# Patient Record
Sex: FEMALE | Race: WHITE | NOT HISPANIC OR LATINO | Employment: OTHER | URBAN - METROPOLITAN AREA
[De-identification: names, ages, dates, MRNs, and addresses within clinical notes are randomized per-mention and may not be internally consistent; named-entity substitution may affect disease eponyms.]

---

## 2023-02-23 LAB
ALANINE AMINOTRANSFERASE (SGPT) (U/L) IN SER/PLAS: 15 U/L (ref 7–45)
ALBUMIN (G/DL) IN SER/PLAS: 4.3 G/DL (ref 3.4–5)
ALKALINE PHOSPHATASE (U/L) IN SER/PLAS: 72 U/L (ref 33–136)
ANION GAP IN SER/PLAS: 14 MMOL/L (ref 10–20)
ASPARTATE AMINOTRANSFERASE (SGOT) (U/L) IN SER/PLAS: 18 U/L (ref 9–39)
BASOPHILS (10*3/UL) IN BLOOD BY AUTOMATED COUNT: 0.04 X10E9/L (ref 0–0.1)
BASOPHILS/100 LEUKOCYTES IN BLOOD BY AUTOMATED COUNT: 0.7 % (ref 0–2)
BILIRUBIN TOTAL (MG/DL) IN SER/PLAS: 0.3 MG/DL (ref 0–1.2)
CALCIDIOL (25 OH VITAMIN D3) (NG/ML) IN SER/PLAS: 79 NG/ML
CALCIUM (MG/DL) IN SER/PLAS: 9.9 MG/DL (ref 8.6–10.6)
CARBON DIOXIDE, TOTAL (MMOL/L) IN SER/PLAS: 24 MMOL/L (ref 21–32)
CHLORIDE (MMOL/L) IN SER/PLAS: 106 MMOL/L (ref 98–107)
CREATININE (MG/DL) IN SER/PLAS: 0.76 MG/DL (ref 0.5–1.05)
EOSINOPHILS (10*3/UL) IN BLOOD BY AUTOMATED COUNT: 0.04 X10E9/L (ref 0–0.4)
EOSINOPHILS/100 LEUKOCYTES IN BLOOD BY AUTOMATED COUNT: 0.7 % (ref 0–6)
ERYTHROCYTE DISTRIBUTION WIDTH (RATIO) BY AUTOMATED COUNT: 12.6 % (ref 11.5–14.5)
ERYTHROCYTE MEAN CORPUSCULAR HEMOGLOBIN CONCENTRATION (G/DL) BY AUTOMATED: 31.3 G/DL (ref 32–36)
ERYTHROCYTE MEAN CORPUSCULAR VOLUME (FL) BY AUTOMATED COUNT: 94 FL (ref 80–100)
ERYTHROCYTES (10*6/UL) IN BLOOD BY AUTOMATED COUNT: 4.24 X10E12/L (ref 4–5.2)
FERRITIN (UG/LL) IN SER/PLAS: 43 UG/L (ref 8–150)
GFR FEMALE: 81 ML/MIN/1.73M2
GLUCOSE (MG/DL) IN SER/PLAS: 90 MG/DL (ref 74–99)
HEMATOCRIT (%) IN BLOOD BY AUTOMATED COUNT: 39.9 % (ref 36–46)
HEMOGLOBIN (G/DL) IN BLOOD: 12.5 G/DL (ref 12–16)
IMMATURE GRANULOCYTES/100 LEUKOCYTES IN BLOOD BY AUTOMATED COUNT: 0.2 % (ref 0–0.9)
IRON (UG/DL) IN SER/PLAS: 41 UG/DL (ref 35–150)
IRON BINDING CAPACITY (UG/DL) IN SER/PLAS: 325 UG/DL (ref 240–445)
IRON SATURATION (%) IN SER/PLAS: 13 % (ref 25–45)
LEUKOCYTES (10*3/UL) IN BLOOD BY AUTOMATED COUNT: 5.7 X10E9/L (ref 4.4–11.3)
LYMPHOCYTES (10*3/UL) IN BLOOD BY AUTOMATED COUNT: 2.21 X10E9/L (ref 0.8–3)
LYMPHOCYTES/100 LEUKOCYTES IN BLOOD BY AUTOMATED COUNT: 38.6 % (ref 13–44)
MAGNESIUM (MG/DL) IN SER/PLAS: 2.08 MG/DL (ref 1.6–2.4)
MONOCYTES (10*3/UL) IN BLOOD BY AUTOMATED COUNT: 0.85 X10E9/L (ref 0.05–0.8)
MONOCYTES/100 LEUKOCYTES IN BLOOD BY AUTOMATED COUNT: 14.8 % (ref 2–10)
NEUTROPHILS (10*3/UL) IN BLOOD BY AUTOMATED COUNT: 2.58 X10E9/L (ref 1.6–5.5)
NEUTROPHILS/100 LEUKOCYTES IN BLOOD BY AUTOMATED COUNT: 45 % (ref 40–80)
NRBC (PER 100 WBCS) BY AUTOMATED COUNT: 0 /100 WBC (ref 0–0)
PLATELETS (10*3/UL) IN BLOOD AUTOMATED COUNT: 281 X10E9/L (ref 150–450)
POTASSIUM (MMOL/L) IN SER/PLAS: 4.2 MMOL/L (ref 3.5–5.3)
PROTEIN TOTAL: 6.8 G/DL (ref 6.4–8.2)
SODIUM (MMOL/L) IN SER/PLAS: 140 MMOL/L (ref 136–145)
THYROTROPIN (MIU/L) IN SER/PLAS BY DETECTION LIMIT <= 0.05 MIU/L: 0.99 MIU/L (ref 0.44–3.98)
THYROXINE (T4) FREE (NG/DL) IN SER/PLAS: 1.41 NG/DL (ref 0.78–1.48)
UREA NITROGEN (MG/DL) IN SER/PLAS: 15 MG/DL (ref 6–23)

## 2023-04-29 LAB
ERYTHROCYTE DISTRIBUTION WIDTH (RATIO) BY AUTOMATED COUNT: 16.4 % (ref 11.5–14.5)
ERYTHROCYTE MEAN CORPUSCULAR HEMOGLOBIN CONCENTRATION (G/DL) BY AUTOMATED: 32.7 G/DL (ref 32–36)
ERYTHROCYTE MEAN CORPUSCULAR VOLUME (FL) BY AUTOMATED COUNT: 87 FL (ref 80–100)
ERYTHROCYTES (10*6/UL) IN BLOOD BY AUTOMATED COUNT: 4.63 X10E12/L (ref 4–5.2)
FERRITIN (UG/LL) IN SER/PLAS: 45 UG/L (ref 8–150)
HEMATOCRIT (%) IN BLOOD BY AUTOMATED COUNT: 40.4 % (ref 36–46)
HEMOGLOBIN (G/DL) IN BLOOD: 13.2 G/DL (ref 12–16)
IRON (UG/DL) IN SER/PLAS: 136 UG/DL (ref 35–150)
IRON BINDING CAPACITY (UG/DL) IN SER/PLAS: 309 UG/DL (ref 240–445)
IRON SATURATION (%) IN SER/PLAS: 44 % (ref 25–45)
LEUKOCYTES (10*3/UL) IN BLOOD BY AUTOMATED COUNT: 8.3 X10E9/L (ref 4.4–11.3)
NRBC (PER 100 WBCS) BY AUTOMATED COUNT: 0 /100 WBC (ref 0–0)
PLATELETS (10*3/UL) IN BLOOD AUTOMATED COUNT: 272 X10E9/L (ref 150–450)

## 2023-05-04 DIAGNOSIS — I10 ESSENTIAL (PRIMARY) HYPERTENSION: ICD-10-CM

## 2023-05-04 PROBLEM — R25.2 LEG CRAMPS: Status: ACTIVE | Noted: 2023-05-04

## 2023-05-04 PROBLEM — E83.119 HEMOCHROMATOSIS: Status: ACTIVE | Noted: 2023-05-04

## 2023-05-04 PROBLEM — M25.561 KNEE PAIN, RIGHT: Status: ACTIVE | Noted: 2023-05-04

## 2023-05-04 PROBLEM — E04.2 NONTOXIC MULTINODULAR GOITER: Status: ACTIVE | Noted: 2023-05-04

## 2023-05-04 PROBLEM — G57.12 MERALGIA PARESTHETICA OF LEFT SIDE: Status: ACTIVE | Noted: 2023-05-04

## 2023-05-04 PROBLEM — R05.9 COUGH: Status: ACTIVE | Noted: 2023-05-04

## 2023-05-04 PROBLEM — E55.9 VITAMIN D DEFICIENCY: Status: ACTIVE | Noted: 2023-05-04

## 2023-05-04 PROBLEM — J31.0 CHRONIC RHINITIS: Status: ACTIVE | Noted: 2023-05-04

## 2023-05-04 PROBLEM — M17.11 OSTEOARTHRITIS OF RIGHT KNEE: Status: ACTIVE | Noted: 2023-05-04

## 2023-05-04 PROBLEM — R82.90 ABNORMAL URINE FINDINGS: Status: ACTIVE | Noted: 2023-05-04

## 2023-05-04 PROBLEM — L30.9 DERMATITIS: Status: ACTIVE | Noted: 2023-05-04

## 2023-05-04 PROBLEM — G45.4 TGA (TRANSIENT GLOBAL AMNESIA): Status: ACTIVE | Noted: 2023-05-04

## 2023-05-04 PROBLEM — R91.1 PULMONARY NODULE: Status: ACTIVE | Noted: 2023-05-04

## 2023-05-04 PROBLEM — E78.00 HYPERCHOLESTEROLEMIA: Status: ACTIVE | Noted: 2023-05-04

## 2023-05-04 PROBLEM — G47.33 OSA (OBSTRUCTIVE SLEEP APNEA): Status: ACTIVE | Noted: 2023-05-04

## 2023-05-04 PROBLEM — M85.80 OSTEOPENIA: Status: ACTIVE | Noted: 2023-05-04

## 2023-05-04 PROBLEM — R06.83 SNORING: Status: ACTIVE | Noted: 2023-05-04

## 2023-05-04 PROBLEM — M17.0 PRIMARY OSTEOARTHRITIS OF BOTH KNEES: Status: ACTIVE | Noted: 2023-05-04

## 2023-05-04 PROBLEM — E03.9 HYPOTHYROIDISM: Status: ACTIVE | Noted: 2023-05-04

## 2023-05-04 PROBLEM — R20.2 PARESTHESIA OF FOOT: Status: ACTIVE | Noted: 2023-05-04

## 2023-05-04 RX ORDER — LOSARTAN POTASSIUM 25 MG/1
TABLET ORAL
Qty: 90 TABLET | Refills: 1 | Status: SHIPPED | OUTPATIENT
Start: 2023-05-04 | End: 2023-08-31 | Stop reason: SDUPTHER

## 2023-06-18 DIAGNOSIS — E03.9 HYPOTHYROIDISM, UNSPECIFIED TYPE: Primary | ICD-10-CM

## 2023-06-19 RX ORDER — LEVOTHYROXINE SODIUM 100 UG/1
TABLET ORAL
Qty: 90 TABLET | Refills: 1 | Status: SHIPPED | OUTPATIENT
Start: 2023-06-19 | End: 2023-08-31 | Stop reason: SDUPTHER

## 2023-08-03 RX ORDER — ACETAMINOPHEN 500 MG
TABLET ORAL
COMMUNITY
End: 2023-10-24

## 2023-08-03 RX ORDER — HYLAN G-F 20 16MG/2ML
SYRINGE (ML) INTRAARTICULAR
COMMUNITY
Start: 2022-08-04 | End: 2023-08-31 | Stop reason: WASHOUT

## 2023-08-03 RX ORDER — PNV NO.95/FERROUS FUM/FOLIC AC 28MG-0.8MG
100 TABLET ORAL DAILY
COMMUNITY
End: 2023-11-03 | Stop reason: WASHOUT

## 2023-08-03 RX ORDER — MULTIVITAMIN
1 TABLET ORAL DAILY
COMMUNITY
End: 2023-11-03 | Stop reason: WASHOUT

## 2023-08-03 RX ORDER — ASCORBIC ACID 250 MG
1 TABLET ORAL DAILY
COMMUNITY
End: 2023-11-03 | Stop reason: SINTOL

## 2023-08-31 ENCOUNTER — LAB (OUTPATIENT)
Dept: LAB | Facility: LAB | Age: 78
End: 2023-08-31
Payer: MEDICARE

## 2023-08-31 ENCOUNTER — OFFICE VISIT (OUTPATIENT)
Dept: PRIMARY CARE | Facility: CLINIC | Age: 78
End: 2023-08-31
Payer: MEDICARE

## 2023-08-31 VITALS
WEIGHT: 143.5 LBS | BODY MASS INDEX: 24.5 KG/M2 | DIASTOLIC BLOOD PRESSURE: 78 MMHG | SYSTOLIC BLOOD PRESSURE: 122 MMHG | HEIGHT: 64 IN | RESPIRATION RATE: 16 BRPM | HEART RATE: 72 BPM

## 2023-08-31 DIAGNOSIS — E03.9 ACQUIRED HYPOTHYROIDISM: ICD-10-CM

## 2023-08-31 DIAGNOSIS — Z00.00 ROUTINE GENERAL MEDICAL EXAMINATION AT HEALTH CARE FACILITY: ICD-10-CM

## 2023-08-31 DIAGNOSIS — E55.9 VITAMIN D DEFICIENCY: ICD-10-CM

## 2023-08-31 DIAGNOSIS — E83.119 HEMOCHROMATOSIS, UNSPECIFIED HEMOCHROMATOSIS TYPE: ICD-10-CM

## 2023-08-31 DIAGNOSIS — Z00.00 MEDICARE ANNUAL WELLNESS VISIT, SUBSEQUENT: ICD-10-CM

## 2023-08-31 DIAGNOSIS — M85.80 OSTEOPENIA, UNSPECIFIED LOCATION: ICD-10-CM

## 2023-08-31 DIAGNOSIS — I10 BENIGN ESSENTIAL HYPERTENSION: ICD-10-CM

## 2023-08-31 DIAGNOSIS — E03.9 HYPOTHYROIDISM, UNSPECIFIED TYPE: ICD-10-CM

## 2023-08-31 DIAGNOSIS — I10 ESSENTIAL (PRIMARY) HYPERTENSION: ICD-10-CM

## 2023-08-31 DIAGNOSIS — Z00.00 MEDICARE ANNUAL WELLNESS VISIT, SUBSEQUENT: Primary | ICD-10-CM

## 2023-08-31 DIAGNOSIS — E78.00 HYPERCHOLESTEROLEMIA: ICD-10-CM

## 2023-08-31 LAB
ALANINE AMINOTRANSFERASE (SGPT) (U/L) IN SER/PLAS: 12 U/L (ref 7–45)
ALBUMIN (G/DL) IN SER/PLAS: 4.4 G/DL (ref 3.4–5)
ALKALINE PHOSPHATASE (U/L) IN SER/PLAS: 80 U/L (ref 33–136)
ANION GAP IN SER/PLAS: 13 MMOL/L (ref 10–20)
APPEARANCE, URINE: CLEAR
ASPARTATE AMINOTRANSFERASE (SGOT) (U/L) IN SER/PLAS: 14 U/L (ref 9–39)
BASOPHILS (10*3/UL) IN BLOOD BY AUTOMATED COUNT: 0.06 X10E9/L (ref 0–0.1)
BASOPHILS/100 LEUKOCYTES IN BLOOD BY AUTOMATED COUNT: 0.7 % (ref 0–2)
BILIRUBIN TOTAL (MG/DL) IN SER/PLAS: 0.6 MG/DL (ref 0–1.2)
BILIRUBIN, URINE: NEGATIVE
BLOOD, URINE: NEGATIVE
CALCIDIOL (25 OH VITAMIN D3) (NG/ML) IN SER/PLAS: 84 NG/ML
CALCIUM (MG/DL) IN SER/PLAS: 10.8 MG/DL (ref 8.6–10.6)
CARBON DIOXIDE, TOTAL (MMOL/L) IN SER/PLAS: 26 MMOL/L (ref 21–32)
CHLORIDE (MMOL/L) IN SER/PLAS: 106 MMOL/L (ref 98–107)
CHOLESTEROL (MG/DL) IN SER/PLAS: 213 MG/DL (ref 0–199)
CHOLESTEROL IN HDL (MG/DL) IN SER/PLAS: 55.2 MG/DL
CHOLESTEROL/HDL RATIO: 3.9
COLOR, URINE: NORMAL
CREATININE (MG/DL) IN SER/PLAS: 0.76 MG/DL (ref 0.5–1.05)
EOSINOPHILS (10*3/UL) IN BLOOD BY AUTOMATED COUNT: 0.19 X10E9/L (ref 0–0.4)
EOSINOPHILS/100 LEUKOCYTES IN BLOOD BY AUTOMATED COUNT: 2.2 % (ref 0–6)
ERYTHROCYTE DISTRIBUTION WIDTH (RATIO) BY AUTOMATED COUNT: 13.2 % (ref 11.5–14.5)
ERYTHROCYTE MEAN CORPUSCULAR HEMOGLOBIN CONCENTRATION (G/DL) BY AUTOMATED: 33.7 G/DL (ref 32–36)
ERYTHROCYTE MEAN CORPUSCULAR VOLUME (FL) BY AUTOMATED COUNT: 100 FL (ref 80–100)
ERYTHROCYTES (10*6/UL) IN BLOOD BY AUTOMATED COUNT: 4.34 X10E12/L (ref 4–5.2)
GFR FEMALE: 81 ML/MIN/1.73M2
GLUCOSE (MG/DL) IN SER/PLAS: 86 MG/DL (ref 74–99)
GLUCOSE, URINE: NEGATIVE MG/DL
HEMATOCRIT (%) IN BLOOD BY AUTOMATED COUNT: 43.3 % (ref 36–46)
HEMOGLOBIN (G/DL) IN BLOOD: 14.6 G/DL (ref 12–16)
IMMATURE GRANULOCYTES/100 LEUKOCYTES IN BLOOD BY AUTOMATED COUNT: 0.2 % (ref 0–0.9)
KETONES, URINE: NEGATIVE MG/DL
LDL: 123 MG/DL (ref 0–99)
LEUKOCYTE ESTERASE, URINE: NEGATIVE
LEUKOCYTES (10*3/UL) IN BLOOD BY AUTOMATED COUNT: 8.8 X10E9/L (ref 4.4–11.3)
LYMPHOCYTES (10*3/UL) IN BLOOD BY AUTOMATED COUNT: 3.14 X10E9/L (ref 0.8–3)
LYMPHOCYTES/100 LEUKOCYTES IN BLOOD BY AUTOMATED COUNT: 35.6 % (ref 13–44)
MONOCYTES (10*3/UL) IN BLOOD BY AUTOMATED COUNT: 0.88 X10E9/L (ref 0.05–0.8)
MONOCYTES/100 LEUKOCYTES IN BLOOD BY AUTOMATED COUNT: 10 % (ref 2–10)
NEUTROPHILS (10*3/UL) IN BLOOD BY AUTOMATED COUNT: 4.54 X10E9/L (ref 1.6–5.5)
NEUTROPHILS/100 LEUKOCYTES IN BLOOD BY AUTOMATED COUNT: 51.3 % (ref 40–80)
NITRITE, URINE: NEGATIVE
NRBC (PER 100 WBCS) BY AUTOMATED COUNT: 0 /100 WBC (ref 0–0)
PH, URINE: 7 (ref 5–8)
PLATELETS (10*3/UL) IN BLOOD AUTOMATED COUNT: 304 X10E9/L (ref 150–450)
POTASSIUM (MMOL/L) IN SER/PLAS: 4.3 MMOL/L (ref 3.5–5.3)
PROTEIN TOTAL: 7.2 G/DL (ref 6.4–8.2)
PROTEIN, URINE: NEGATIVE MG/DL
SODIUM (MMOL/L) IN SER/PLAS: 141 MMOL/L (ref 136–145)
SPECIFIC GRAVITY, URINE: 1.01 (ref 1–1.03)
THYROTROPIN (MIU/L) IN SER/PLAS BY DETECTION LIMIT <= 0.05 MIU/L: 1.18 MIU/L (ref 0.44–3.98)
TRIGLYCERIDE (MG/DL) IN SER/PLAS: 172 MG/DL (ref 0–149)
UREA NITROGEN (MG/DL) IN SER/PLAS: 22 MG/DL (ref 6–23)
UROBILINOGEN, URINE: <2 MG/DL (ref 0–1.9)
VLDL: 34 MG/DL (ref 0–40)

## 2023-08-31 PROCEDURE — G0439 PPPS, SUBSEQ VISIT: HCPCS | Performed by: INTERNAL MEDICINE

## 2023-08-31 PROCEDURE — 80053 COMPREHEN METABOLIC PANEL: CPT

## 2023-08-31 PROCEDURE — 85025 COMPLETE CBC W/AUTO DIFF WBC: CPT

## 2023-08-31 PROCEDURE — 1170F FXNL STATUS ASSESSED: CPT | Performed by: INTERNAL MEDICINE

## 2023-08-31 PROCEDURE — 3074F SYST BP LT 130 MM HG: CPT | Performed by: INTERNAL MEDICINE

## 2023-08-31 PROCEDURE — 3078F DIAST BP <80 MM HG: CPT | Performed by: INTERNAL MEDICINE

## 2023-08-31 PROCEDURE — 36415 COLL VENOUS BLD VENIPUNCTURE: CPT

## 2023-08-31 PROCEDURE — 1036F TOBACCO NON-USER: CPT | Performed by: INTERNAL MEDICINE

## 2023-08-31 PROCEDURE — 80061 LIPID PANEL: CPT

## 2023-08-31 PROCEDURE — 84443 ASSAY THYROID STIM HORMONE: CPT

## 2023-08-31 PROCEDURE — 1159F MED LIST DOCD IN RCRD: CPT | Performed by: INTERNAL MEDICINE

## 2023-08-31 PROCEDURE — 99397 PER PM REEVAL EST PAT 65+ YR: CPT | Performed by: INTERNAL MEDICINE

## 2023-08-31 PROCEDURE — 1125F AMNT PAIN NOTED PAIN PRSNT: CPT | Performed by: INTERNAL MEDICINE

## 2023-08-31 PROCEDURE — 1160F RVW MEDS BY RX/DR IN RCRD: CPT | Performed by: INTERNAL MEDICINE

## 2023-08-31 PROCEDURE — 81003 URINALYSIS AUTO W/O SCOPE: CPT

## 2023-08-31 PROCEDURE — 82306 VITAMIN D 25 HYDROXY: CPT

## 2023-08-31 RX ORDER — LOSARTAN POTASSIUM 25 MG/1
25 TABLET ORAL DAILY
Qty: 90 TABLET | Refills: 1 | Status: SHIPPED | OUTPATIENT
Start: 2023-08-31 | End: 2024-04-15 | Stop reason: SDUPTHER

## 2023-08-31 RX ORDER — LEVOTHYROXINE SODIUM 100 UG/1
100 TABLET ORAL DAILY
Qty: 90 TABLET | Refills: 1 | Status: SHIPPED | OUTPATIENT
Start: 2023-08-31 | End: 2024-04-15 | Stop reason: SDUPTHER

## 2023-08-31 ASSESSMENT — ENCOUNTER SYMPTOMS
WEAKNESS: 0
BACK PAIN: 0
FATIGUE: 0
EYE PAIN: 0
FREQUENCY: 0
JOINT SWELLING: 0
MYALGIAS: 0
DYSPHORIC MOOD: 0
HEMATURIA: 0
NECK PAIN: 0
DIZZINESS: 0
ARTHRALGIAS: 1
WHEEZING: 0
APPETITE CHANGE: 0
DIAPHORESIS: 0
BLOOD IN STOOL: 0
ABDOMINAL PAIN: 0
FEVER: 0
DIFFICULTY URINATING: 0
CHILLS: 0
ADENOPATHY: 0
DYSURIA: 0
SINUS PRESSURE: 0
CONFUSION: 0
LIGHT-HEADEDNESS: 0
SLEEP DISTURBANCE: 0
EYE DISCHARGE: 0
NERVOUS/ANXIOUS: 0
CONSTIPATION: 0
ACTIVITY CHANGE: 0
DIARRHEA: 0
SORE THROAT: 0
SHORTNESS OF BREATH: 0
FLANK PAIN: 0
CHEST TIGHTNESS: 0
EYE ITCHING: 0
HEADACHES: 0
COUGH: 0
PALPITATIONS: 0
BRUISES/BLEEDS EASILY: 0
VOMITING: 0
RHINORRHEA: 0
NAUSEA: 0
POLYDIPSIA: 0
FACIAL SWELLING: 0
NUMBNESS: 0
POLYPHAGIA: 0

## 2023-08-31 ASSESSMENT — ACTIVITIES OF DAILY LIVING (ADL)
DOING_HOUSEWORK: INDEPENDENT
DRESSING: INDEPENDENT
GROCERY_SHOPPING: INDEPENDENT
TAKING_MEDICATION: INDEPENDENT
MANAGING_FINANCES: INDEPENDENT
BATHING: INDEPENDENT

## 2023-08-31 ASSESSMENT — PATIENT HEALTH QUESTIONNAIRE - PHQ9
SUM OF ALL RESPONSES TO PHQ9 QUESTIONS 1 AND 2: 0
2. FEELING DOWN, DEPRESSED OR HOPELESS: NOT AT ALL
1. LITTLE INTEREST OR PLEASURE IN DOING THINGS: NOT AT ALL

## 2023-08-31 NOTE — ASSESSMENT & PLAN NOTE
Fasting BW/UA ordered  EKG done with PAT   Mammogram 1/26/23  Dexa bone density 1/26/23  Cologuard 7/1/20 negative

## 2023-08-31 NOTE — PROGRESS NOTES
Subjective   Patient ID: Susy Sterling is a 78 y.o. female who presents for No chief complaint on file..    She has a history of HTN, hypercholesterolemia, hypothyroidism, osteopenia, vitamin d deficiency, hemochromatosis          Review of Systems    Objective   There were no vitals taken for this visit.    Physical Exam    Assessment/Plan   Problem List Items Addressed This Visit       Medicare annual wellness visit, subsequent - Primary     Fasting BW/UA ordered  EKG done in office   Mammogram 1/26/23  Dexa bone density 1/26/23  Cologuard 7/1/20 negative

## 2023-08-31 NOTE — PROGRESS NOTES
Subjective   Reason for Visit: Susy Sterling is an 78 y.o. female here for a Medicare Wellness visit.     Past Medical, Surgical, and Family History reviewed and updated in chart.       She has a history of HTN, hypercholesterolemia, hypothyroidism, osteopenia, vitamin d deficiency, hemochromatosis     She had right knee TKA with Dr Davenport at [facility] 7/13/23. She has 3 more sessions of PT.   She is doing well - no issues with walking. Not driving yet.         Patient Care Team:  Nevaeh Jones MD as PCP - General  Nevaeh Jones MD as PCP - [employer] PCP     Review of Systems   Constitutional:  Negative for activity change, appetite change, chills, diaphoresis, fatigue and fever.   HENT:  Negative for congestion, ear discharge, ear pain, facial swelling, postnasal drip, rhinorrhea, sinus pressure and sore throat.    Eyes:  Negative for pain, discharge and itching.   Respiratory:  Negative for cough, chest tightness, shortness of breath and wheezing.    Cardiovascular:  Negative for chest pain, palpitations and leg swelling.   Gastrointestinal:  Negative for abdominal pain, blood in stool, constipation, diarrhea, nausea and vomiting.   Endocrine: Negative for cold intolerance, heat intolerance, polydipsia, polyphagia and polyuria.   Genitourinary:  Negative for difficulty urinating, dysuria, flank pain, frequency and hematuria.   Musculoskeletal:  Positive for arthralgias. Negative for back pain, joint swelling, myalgias and neck pain.   Skin: Negative.    Neurological:  Negative for dizziness, syncope, weakness, light-headedness, numbness and headaches.   Hematological:  Negative for adenopathy. Does not bruise/bleed easily.   Psychiatric/Behavioral:  Negative for behavioral problems, confusion, dysphoric mood, sleep disturbance and suicidal ideas. The patient is not nervous/anxious.        Objective   Vitals:  /78 (BP Location: Left arm, Patient Position: Sitting)   Pulse 72   Resp 16   " Ht 1.626 m (5' 4\")   Wt 65.1 kg (143 lb 8 oz)   BMI 24.63 kg/m²       Physical Exam  Constitutional:       Appearance: Normal appearance. She is normal weight. She is not ill-appearing.   HENT:      Head: Normocephalic and atraumatic.      Right Ear: Tympanic membrane, ear canal and external ear normal.      Left Ear: Tympanic membrane, ear canal and external ear normal.      Nose: Nose normal.      Mouth/Throat:      Mouth: Mucous membranes are moist.      Pharynx: Oropharynx is clear.   Eyes:      General: No scleral icterus.     Extraocular Movements: Extraocular movements intact.      Conjunctiva/sclera: Conjunctivae normal.      Pupils: Pupils are equal, round, and reactive to light.   Neck:      Vascular: No carotid bruit.   Cardiovascular:      Rate and Rhythm: Normal rate and regular rhythm.      Pulses: Normal pulses.      Heart sounds: No murmur heard.     No gallop.   Pulmonary:      Effort: Pulmonary effort is normal. No respiratory distress.      Breath sounds: No wheezing, rhonchi or rales.   Abdominal:      General: Bowel sounds are normal. There is no distension.      Palpations: Abdomen is soft. There is no mass.      Tenderness: There is no abdominal tenderness.      Hernia: No hernia is present.   Musculoskeletal:         General: No swelling, tenderness or deformity. Normal range of motion.      Cervical back: Normal range of motion and neck supple.      Comments: S/p right TKA  - incision C/D/I   Skin:     General: Skin is warm and dry.      Findings: No rash.   Neurological:      General: No focal deficit present.      Mental Status: She is alert and oriented to person, place, and time.      Cranial Nerves: No cranial nerve deficit.      Motor: No weakness.      Deep Tendon Reflexes: Reflexes normal.   Psychiatric:         Mood and Affect: Mood normal.         Behavior: Behavior normal.         Thought Content: Thought content normal.         Judgment: Judgment normal. "         Assessment/Plan   Problem List Items Addressed This Visit       Benign essential hypertension    Current Assessment & Plan     Blood pressure excellent control  Continue losartan 25 mg daily, DASH diet, and exercise         Relevant Orders    Comprehensive Metabolic Panel (Completed)    Hemochromatosis    Overview     Liver ultrasound 3/15/2018 noting increased echogenicity consistent with history of hemochromatosis.         Current Assessment & Plan     Monitored with Dr Marie now (previously Dr. Yap)  - phlebotomies per Dr. Marie with goal percent iron saturation less than 50%.with CBC/ Fe studies every 3 months   - liver function tests, alpha-fetoprotein per GI              Relevant Orders    CBC and Auto Differential (Completed)    Hypothyroidism    Current Assessment & Plan     Clinically euthyroid  Continue levothyroxine 100 mcg daily         Relevant Medications    levothyroxine (Synthroid, Levoxyl) 100 mcg tablet    Other Relevant Orders    TSH with reflex to Free T4 if abnormal (Completed)    Osteopenia    Current Assessment & Plan     advise calcium/ Vitamin D  Advise  recheck Dexa 2025         Relevant Orders    Vitamin D 25-Hydroxy,Total (for eval of Vitamin D levels) (Completed)    Vitamin D deficiency    Current Assessment & Plan     Currently taking vitamin D 2000 international units daily  will check vitamin D 25-hydroxy and further advise         Relevant Orders    Vitamin D 25-Hydroxy,Total (for eval of Vitamin D levels) (Completed)    Medicare annual wellness visit, subsequent - Primary    Current Assessment & Plan     Fasting BW/UA ordered  EKG done with PAT   Mammogram 1/26/23  Dexa bone density 1/26/23  Cologuard 7/1/20 negative         Relevant Orders    CBC and Auto Differential (Completed)    Comprehensive Metabolic Panel (Completed)    Lipid Panel (Completed)    TSH with reflex to Free T4 if abnormal (Completed)    Urinalysis with Reflex Microscopic and Culture    Vitamin D  25-Hydroxy,Total (for eval of Vitamin D levels) (Completed)     Other Visit Diagnoses       Routine general medical examination at health care facility        Relevant Orders    CBC and Auto Differential (Completed)    Comprehensive Metabolic Panel (Completed)    Lipid Panel (Completed)    TSH with reflex to Free T4 if abnormal (Completed)    Urinalysis with Reflex Microscopic and Culture    Vitamin D 25-Hydroxy,Total (for eval of Vitamin D levels) (Completed)    Essential (primary) hypertension        Relevant Medications    losartan (Cozaar) 25 mg tablet

## 2023-09-01 NOTE — ASSESSMENT & PLAN NOTE
Monitored with Dr Marie now (previously Dr. Yap)  - phlebotomies per Dr. Marie with goal percent iron saturation less than 50%.with CBC/ Fe studies every 3 months   - liver function tests, alpha-fetoprotein per GI

## 2023-09-01 NOTE — ASSESSMENT & PLAN NOTE
Currently taking vitamin D 2000 international units daily  will check vitamin D 25-hydroxy and further advise

## 2023-09-05 PROBLEM — E83.52 HYPERCALCEMIA: Status: ACTIVE | Noted: 2023-09-05

## 2023-09-28 LAB
ERYTHROCYTE DISTRIBUTION WIDTH (RATIO) BY AUTOMATED COUNT: 12.7 % (ref 11.5–14.5)
ERYTHROCYTE MEAN CORPUSCULAR HEMOGLOBIN CONCENTRATION (G/DL) BY AUTOMATED: 33.7 G/DL (ref 32–36)
ERYTHROCYTE MEAN CORPUSCULAR VOLUME (FL) BY AUTOMATED COUNT: 100 FL (ref 80–100)
ERYTHROCYTES (10*6/UL) IN BLOOD BY AUTOMATED COUNT: 4.15 X10E12/L (ref 4–5.2)
FERRITIN (UG/LL) IN SER/PLAS: 113 UG/L (ref 8–150)
HEMATOCRIT (%) IN BLOOD BY AUTOMATED COUNT: 41.6 % (ref 36–46)
HEMOGLOBIN (G/DL) IN BLOOD: 14 G/DL (ref 12–16)
IRON (UG/DL) IN SER/PLAS: 201 UG/DL (ref 35–150)
IRON BINDING CAPACITY (UG/DL) IN SER/PLAS: 273 UG/DL (ref 240–445)
IRON SATURATION (%) IN SER/PLAS: 74 % (ref 25–45)
LEUKOCYTES (10*3/UL) IN BLOOD BY AUTOMATED COUNT: 8.8 X10E9/L (ref 4.4–11.3)
NRBC (PER 100 WBCS) BY AUTOMATED COUNT: 0 /100 WBC (ref 0–0)
PLATELETS (10*3/UL) IN BLOOD AUTOMATED COUNT: 302 X10E9/L (ref 150–450)

## 2023-10-10 DIAGNOSIS — E83.119 HEMOCHROMATOSIS, UNSPECIFIED HEMOCHROMATOSIS TYPE: Primary | ICD-10-CM

## 2023-10-18 ENCOUNTER — TELEPHONE (OUTPATIENT)
Dept: HEMATOLOGY/ONCOLOGY | Facility: CLINIC | Age: 78
End: 2023-10-18
Payer: MEDICARE

## 2023-10-18 NOTE — TELEPHONE ENCOUNTER
Per Dottie, reschedule NPV from [facility] to [facility] and contact patient with information. [facility]. Called patient to advise. No answer. Left vm advising of physician change and new appt date/time. Advised pt that appt information will also show on [facility]. Advised pt to call if this appt does not work.

## 2023-10-22 ENCOUNTER — LAB REQUISITION (OUTPATIENT)
Dept: LAB | Facility: HOSPITAL | Age: 78
End: 2023-10-22
Payer: MEDICARE

## 2023-10-22 DIAGNOSIS — N39.0 URINARY TRACT INFECTION, SITE NOT SPECIFIED: ICD-10-CM

## 2023-10-22 PROCEDURE — 87086 URINE CULTURE/COLONY COUNT: CPT

## 2023-10-23 ASSESSMENT — PATIENT HEALTH QUESTIONNAIRE - PHQ9
4. FEELING TIRED OR HAVING LITTLE ENERGY: NOT AT ALL
6. FEELING BAD ABOUT YOURSELF - OR THAT YOU ARE A FAILURE OR HAVE LET YOURSELF OR YOUR FAMILY DOWN: NOT AT ALL
7. TROUBLE CONCENTRATING ON THINGS, SUCH AS READING THE NEWSPAPER OR WATCHING TELEVISION: NOT AT ALL
2. FEELING DOWN, DEPRESSED, IRRITABLE, OR HOPELESS: 0
5. POOR APPETITE OR OVEREATING: NOT AT ALL
8. MOVING OR SPEAKING SO SLOWLY THAT OTHER PEOPLE COULD HAVE NOTICED. OR THE OPPOSITE, BEING SO FIGETY OR RESTLESS THAT YOU HAVE BEEN MOVING AROUND A LOT MORE THAN USUAL: NOT AT ALL
6. FEELING BAD ABOUT YOURSELF - OR THAT YOU ARE A FAILURE OR HAVE LET YOURSELF OR YOUR FAMILY DOWN: 0
4. FEELING TIRED OR HAVING LITTLE ENERGY: 0
SUM OF ALL RESPONSES TO PHQ QUESTIONS 1-9: 0
4. FEELING TIRED OR HAVING LITTLE ENERGY: NOT AT ALL
3. TROUBLE FALLING OR STAYING ASLEEP OR SLEEPING TOO MUCH: NOT AT ALL
2. FEELING DOWN, DEPRESSED OR HOPELESS: NOT AT ALL
7. TROUBLE CONCENTRATING ON THINGS, SUCH AS READING THE NEWSPAPER OR WATCHING TELEVISION: NOT AT ALL
9. THOUGHTS THAT YOU WOULD BE BETTER OFF DEAD, OR OF HURTING YOURSELF: 0
1. LITTLE INTEREST OR PLEASURE IN DOING THINGS: NOT AT ALL
1. LITTLE INTEREST OR PLEASURE IN DOING THINGS: NOT AT ALL
3. TROUBLE FALLING OR STAYING ASLEEP OR SLEEPING TOO MUCH: 0
6. FEELING BAD ABOUT YOURSELF - OR THAT YOU ARE A FAILURE OR HAVE LET YOURSELF OR YOUR FAMILY DOWN: NOT AT ALL
9. THOUGHTS THAT YOU WOULD BE BETTER OFF DEAD, OR OF HURTING YOURSELF: NOT AT ALL
5. POOR APPETITE OR OVEREATING: 0
1. LITTLE INTEREST OR PLEASURE IN DOING THINGS: 0
9. THOUGHTS THAT YOU WOULD BE BETTER OFF DEAD, OR OF HURTING YOURSELF: NOT AT ALL
SUM OF ALL RESPONSES TO PHQ QUESTIONS 1-9: 0
8. MOVING OR SPEAKING SO SLOWLY THAT OTHER PEOPLE COULD HAVE NOTICED. OR THE OPPOSITE, BEING SO FIGETY OR RESTLESS THAT YOU HAVE BEEN MOVING AROUND A LOT MORE THAN USUAL: 0
3. TROUBLE FALLING OR STAYING ASLEEP OR SLEEPING TOO MUCH: NOT AT ALL
7. TROUBLE CONCENTRATING ON THINGS, SUCH AS READING THE NEWSPAPER OR WATCHING TELEVISION: 0
2. FEELING DOWN, DEPRESSED, IRRITABLE, OR HOPELESS: NOT AT ALL
8. MOVING OR SPEAKING SO SLOWLY THAT OTHER PEOPLE COULD HAVE NOTICED. OR THE OPPOSITE, BEING SO FIGETY OR RESTLESS THAT YOU HAVE BEEN MOVING AROUND A LOT MORE THAN USUAL: NOT AT ALL
5. POOR APPETITE OR OVEREATING: NOT AT ALL

## 2023-10-24 PROBLEM — J30.9 CHRONIC ALLERGIC RHINITIS: Status: ACTIVE | Noted: 2023-10-24

## 2023-10-24 PROBLEM — M25.561 ARTHRALGIA OF RIGHT KNEE: Status: ACTIVE | Noted: 2023-05-04

## 2023-10-24 PROBLEM — Z78.0 POSTMENOPAUSAL: Status: ACTIVE | Noted: 2023-10-24

## 2023-10-24 PROBLEM — L98.9 INFLAMMATORY DERMATOSIS: Status: ACTIVE | Noted: 2023-05-04

## 2023-10-24 LAB — BACTERIA UR CULT: NORMAL

## 2023-10-24 RX ORDER — LANOLIN ALCOHOL/MO/W.PET/CERES
1 CREAM (GRAM) TOPICAL DAILY
COMMUNITY
Start: 2023-06-29

## 2023-10-24 RX ORDER — MILK THISTLE 150 MG
1 CAPSULE ORAL DAILY
COMMUNITY
Start: 2023-06-29

## 2023-10-24 RX ORDER — LANOLIN ALCOHOL/MO/W.PET/CERES
2 CREAM (GRAM) TOPICAL DAILY
COMMUNITY
Start: 2023-06-29

## 2023-10-24 RX ORDER — TRAMADOL HYDROCHLORIDE 50 MG/1
1-2 TABLET ORAL EVERY 6 HOURS PRN
COMMUNITY
Start: 2023-07-14 | End: 2023-11-27 | Stop reason: ALTCHOICE

## 2023-10-24 RX ORDER — PRENATAL VIT CALC,IRON,FOLIC
1 TABLET ORAL DAILY
COMMUNITY
Start: 2023-06-29 | End: 2023-11-03 | Stop reason: WASHOUT

## 2023-10-24 RX ORDER — HYDROCODONE BITARTRATE AND ACETAMINOPHEN 5; 325 MG/1; MG/1
1-2 TABLET ORAL AS NEEDED
COMMUNITY
Start: 2023-07-14 | End: 2023-11-03 | Stop reason: ALTCHOICE

## 2023-10-24 RX ORDER — ASPIRIN 81 MG/1
1 TABLET ORAL DAILY
COMMUNITY
Start: 2023-07-14 | End: 2023-11-27 | Stop reason: ALTCHOICE

## 2023-10-24 RX ORDER — GLUCOSAM/CHONDRO/HERB 149/HYAL 750-100 MG
1 TABLET ORAL DAILY
COMMUNITY
Start: 2011-02-18 | End: 2023-11-27 | Stop reason: ALTCHOICE

## 2023-10-24 RX ORDER — CREAM BASE NO.150
CREAM (GRAM) TOPICAL DAILY
COMMUNITY

## 2023-10-24 RX ORDER — IBUPROFEN 200 MG
1 CAPSULE ORAL DAILY
COMMUNITY
Start: 2011-02-18 | End: 2023-11-03 | Stop reason: WASHOUT

## 2023-10-24 RX ORDER — ONDANSETRON 4 MG/1
1 TABLET, ORALLY DISINTEGRATING ORAL EVERY 4 HOURS PRN
COMMUNITY
Start: 2016-08-31 | End: 2023-11-27 | Stop reason: ALTCHOICE

## 2023-10-24 RX ORDER — MULTIVITAMIN
TABLET ORAL DAILY
COMMUNITY
End: 2023-11-03 | Stop reason: WASHOUT

## 2023-10-24 RX ORDER — CHOLECALCIFEROL (VITAMIN D3) 50 MCG
1 TABLET ORAL DAILY
COMMUNITY
Start: 2023-06-29

## 2023-10-24 RX ORDER — CLOPIDOGREL BISULFATE 75 MG/1
1 TABLET ORAL DAILY
COMMUNITY
Start: 2015-01-22 | End: 2023-11-27 | Stop reason: ALTCHOICE

## 2023-10-25 ENCOUNTER — OFFICE VISIT (OUTPATIENT)
Dept: HEMATOLOGY/ONCOLOGY | Facility: CLINIC | Age: 78
End: 2023-10-25
Payer: MEDICARE

## 2023-10-25 VITALS
TEMPERATURE: 98.2 F | WEIGHT: 149.47 LBS | SYSTOLIC BLOOD PRESSURE: 138 MMHG | RESPIRATION RATE: 18 BRPM | HEIGHT: 64 IN | BODY MASS INDEX: 25.52 KG/M2 | HEART RATE: 82 BPM | DIASTOLIC BLOOD PRESSURE: 91 MMHG | OXYGEN SATURATION: 98 %

## 2023-10-25 DIAGNOSIS — E83.119 HEMOCHROMATOSIS, UNSPECIFIED HEMOCHROMATOSIS TYPE: ICD-10-CM

## 2023-10-25 PROCEDURE — 99215 OFFICE O/P EST HI 40 MIN: CPT | Performed by: NURSE PRACTITIONER

## 2023-10-25 PROCEDURE — 1159F MED LIST DOCD IN RCRD: CPT | Performed by: NURSE PRACTITIONER

## 2023-10-25 PROCEDURE — 3075F SYST BP GE 130 - 139MM HG: CPT | Performed by: NURSE PRACTITIONER

## 2023-10-25 PROCEDURE — 1160F RVW MEDS BY RX/DR IN RCRD: CPT | Performed by: NURSE PRACTITIONER

## 2023-10-25 PROCEDURE — 3080F DIAST BP >= 90 MM HG: CPT | Performed by: NURSE PRACTITIONER

## 2023-10-25 PROCEDURE — 1036F TOBACCO NON-USER: CPT | Performed by: NURSE PRACTITIONER

## 2023-10-25 PROCEDURE — 1125F AMNT PAIN NOTED PAIN PRSNT: CPT | Performed by: NURSE PRACTITIONER

## 2023-10-25 PROCEDURE — 99205 OFFICE O/P NEW HI 60 MIN: CPT | Performed by: NURSE PRACTITIONER

## 2023-10-25 ASSESSMENT — PAIN SCALES - GENERAL: PAINLEVEL: 2

## 2023-10-25 NOTE — PROGRESS NOTES
Patient to follow-up in December as scheduled.  Call back instructions reviewed.  Patient verbalized understanding.

## 2023-10-25 NOTE — PROGRESS NOTES
Patient ID: Susy Sterling is a 78 y.o. female.  Referring Physician: No referring provider defined for this encounter.  Primary Care Provider: Gabriela Colón DO  Visit Type: Initial Visit      Subjective    HPI  Location: [facility]  Initial consult: 10/25/23    Patient is a 77 yo female with a PMH of HH, htn, hypothyroid, OA, graham and was referred to benign hematology for consultation of hemachromatosis. Referred by Dr. Jones .    Today, patient presents for initial consultation. Originally diagnosed HH in 2005 or 2006 by GI. Just had a Phleb today for hgb 13 and 2 weeks ago for hgb 14 per pt at [facility] at . [address] on 9/27/23.  She tells me they normally phleb her every 2 weeks until Hgb 12 or less. Does feel exhausted, fatigued, and weaker after these repeated phlebs.       Denies abnormal weight loss, night sweats, fever, sob, cp, n/v/d, n/t. No PICA. Denies any abnormal bleeding or bruising. No recurrent infections or lymphadenopathy. No known blood disorders in family. Does have OA - recent knee surgery.       UTD Cancer screenings  PSH: knee sx 2023  Meds: see list     Review of Systems - Oncology 10 point review of systems negative except as state in HPI.     Objective   BSA: There is no height or weight on file to calculate BSA.  There were no vitals taken for this visit.     has no past medical history on file.   has a past surgical history that includes Tonsillectomy (05/12/2014); Knee arthroscopy w/ debridement (02/13/2015); Tubal ligation (02/13/2015); Incisional breast biopsy (02/13/2015); and Other surgical history (06/06/2017).  Family History   Problem Relation Name Age of Onset    Suicidality Father       Oncology History    No history exists.       Susy Sterling  reports that she quit smoking about 52 years ago. Her smoking use included cigarettes. She has never used smokeless tobacco.  She  reports that she does not currently use alcohol.  She  reports no history of drug  use.    Physical Exam  Constitutional:       Comments: petite   HENT:      Head: Normocephalic.      Mouth/Throat:      Mouth: Mucous membranes are moist.   Eyes:      Conjunctiva/sclera: Conjunctivae normal.   Cardiovascular:      Rate and Rhythm: Normal rate and regular rhythm.   Pulmonary:      Effort: Pulmonary effort is normal.      Breath sounds: Normal breath sounds.   Abdominal:      General: Abdomen is flat. Bowel sounds are normal. There is no distension.      Palpations: Abdomen is soft. There is no hepatomegaly or splenomegaly.      Tenderness: There is no abdominal tenderness.   Musculoskeletal:      Cervical back: Normal range of motion and neck supple.      Comments: Arthritic changes   Lymphadenopathy:      Cervical: No cervical adenopathy.   Skin:     General: Skin is warm.   Neurological:      Mental Status: She is alert and oriented to person, place, and time.   Psychiatric:         Mood and Affect: Mood normal.         Behavior: Behavior normal.        Latest Reference Range & Units 02/23/23 11:45 04/28/23 15:22 08/31/23 12:16 09/27/23 14:24 10/22/23 09:00   GLUCOSE 74 - 99 mg/dL 90  86     SODIUM 136 - 145 mmol/L 140  141     POTASSIUM 3.5 - 5.3 mmol/L 4.2  4.3     CHLORIDE 98 - 107 mmol/L 106  106     Bicarbonate 21 - 32 mmol/L 24  26     Anion Gap 10 - 20 mmol/L 14  13     Blood Urea Nitrogen 6 - 23 mg/dL 15  22     Creatinine 0.50 - 1.05 mg/dL 0.76  0.76     Calcium 8.6 - 10.6 mg/dL 9.9  10.8 (H)     Albumin 3.4 - 5.0 g/dL 4.3  4.4     Alkaline Phosphatase 33 - 136 U/L 72  80     ALT 7 - 45 U/L 15  12     AST 9 - 39 U/L 18  14     Bilirubin Total 0.0 - 1.2 mg/dL 0.3  0.6     HDL CHOLESTEROL mg/dL   55.2     Cholesterol/HDL Ratio    3.9     LDL Calculated 0 - 99 mg/dL   123 (H)     VLDL 0 - 40 mg/dL   34     TRIGLYCERIDES 0 - 149 mg/dL   172 (H)     FERRITIN 8 - 150 ug/L 43 45  113    Total Protein 6.4 - 8.2 g/dL 6.8  7.2     IRON 35 - 150 ug/dL 41 136  201 (H)    MAGNESIUM 1.60 - 2.40 mg/dL  2.08       CHOLESTEROL 0 - 199 mg/dL   213 (H)     TIBC 240 - 445 ug/dL 325 309  273    % Saturation 25 - 45 % 13 (L) 44  74 (H)    Thyroxine, Free 0.78 - 1.48 ng/dL 1.41       Thyroid Stimulating Hormone 0.44 - 3.98 mIU/L 0.99  1.18     Vitamin D, 25-Hydroxy, Total ng/mL 79  84     WBC 4.4 - 11.3 x10E9/L 5.7 8.3 8.8 8.8    nRBC 0.0 - 0.0 /100 WBC 0.0 0.0 0.0 0.0    RBC 4.00 - 5.20 x10E12/L 4.24 4.63 4.34 4.15    HEMOGLOBIN 12.0 - 16.0 g/dL 12.5 13.2 14.6 14.0    HEMATOCRIT 36.0 - 46.0 % 39.9 40.4 43.3 41.6    MCV 80 - 100 fL 94 87 100 100    MCHC 32.0 - 36.0 g/dL 31.3 (L) 32.7 33.7 33.7    RED CELL DISTRIBUTION WIDTH 11.5 - 14.5 % 12.6 16.4 (H) 13.2 12.7    Platelets 150 - 450 x10E9/L 281 272 304 302    Neutrophils % 40.0 - 80.0 % 45.0  51.3     Immature Granulocytes %, Automated 0.0 - 0.9 % 0.2  0.2     Lymphocytes % 13.0 - 44.0 % 38.6  35.6     Monocytes % 2.0 - 10.0 % 14.8  10.0     Eosinophils % 0.0 - 6.0 % 0.7  2.2     Basophils % 0.0 - 2.0 % 0.7  0.7     Neutrophils Absolute 1.60 - 5.50 x10E9/L 2.58  4.54     Lymphocytes Absolute 0.80 - 3.00 x10E9/L 2.21  3.14 (H)     Monocytes Absolute 0.05 - 0.80 x10E9/L 0.85 (H)  0.88 (H)     Eosinophils Absolute 0.00 - 0.40 x10E9/L 0.04  0.19     Basophils Absolute 0.00 - 0.10 x10E9/L 0.04  0.06     URINE CULTURE      Rpt   Color, Urine STRAW,YELLOW    STRAW     Appearance, Urine CLEAR    CLEAR     Specific Gravity, Urine 1.005 - 1.035    1.009     pH, Urine 5.0 - 8.0    7.0     Protein, Urine NEGATIVE mg/dL   NEGATIVE     Glucose, Urine NEGATIVE mg/dL   NEGATIVE     Blood, Urine NEGATIVE    NEGATIVE     Ketones, Urine NEGATIVE mg/dL   NEGATIVE     Bilirubin, Urine NEGATIVE    NEGATIVE     Urobilinogen, Urine 0.0 - 1.9 mg/dL   <2.0     Nitrite, Urine NEGATIVE    NEGATIVE     Leukocyte Esterase, Urine NEGATIVE    NEGATIVE     GFR Female >90 mL/min/1.73m2 81  81     (H): Data is abnormally high  (L): Data is abnormally low  Rpt: View report in Results Review for more  information    Assessment/Plan    Patient is a 79 yo female with a PMH of HH, htn, hypothyroid, OA, graham and was referred to benign hematology for consultation of hemachromatosis. Referred by Dr. Jones .    Originally diagnosed HH in 2005 or 2006 by GI. Just had a Phleb today for hgb 13 and 2 weeks ago for hgb 14 per pt at [facility] at . Fer[address] on 9/27/23.  She tells me they normally phleb her every 2 weeks until Hgb 12 or less. Does feel exhausted, fatigued, and weaker after these repeated phlebs.      - Discussed concern she may be getting over phlebotomized if this is true. Review of records reveal ferritin has been well controlled overall since before 2018, normally below 100. Liver fxn, kidney fxn, thyroid fxn, and cbc stable    Discussed the goal of treatment is to prevent organ dysfunction from excess iron and/or to improve organ function if it has already been affected. To avoid iron pills, well water, and vitamin c pills. Discussed Iron overload and alcohol may have an additive or synergistic effect on the liver. Limiting alcohol is especially important until excess iron has been removed. Following iron removal, it may be possible to consume alcohol on a limited basis (not more than one to two drinks per day).  Discussed getting 1st degree relatives tested for iron overload/HH as well    Goal for ferritin normally 100 or less OR ferritin 50 or less depending on other factors (liver enzymes, etc) and provider/patient preference. The patient and I discussed the importance of good hydration the day before, the day of and the day after each phlebotomy in order to prevent side effects. Normal saline can also be administered with each phlebotomy to decrease symptoms. The patient should report any symptoms after the procedure.    Plan  1. Will hold off on further phlebotomies for now as she just had 2 (last one today) for ferritin 113 on 9/27    - Note: pt would like us to take over phleb at [facility]  2.  Follow-up in dec with labs 1-7 days before appt (cbcd, cmp, iron panel, ferritin)  3. Stop taking vit c tab regularly    I had an extensive discussion with the patient regarding the diagnosis and discussed the plan of therapy, including general considerations regarding side effects and outcomes. Pt understood and gave appropriate teach back about the plan of care. All questions were answered to the patient's satisfaction. The patient is instructed to contact us at any time if questions or problems arise. Thank you for the opportunity to participate in the care of this very pleasant patient.    Total time =60 minutes. 50% or more of this time was spent in counseling and/or coordination of care including reviewing medical history/radiology/labs, examining patient, formulating outlined plan with team, and discussing plan with patient/family.                 Emperatriz Correia, APRN-CNP

## 2023-10-31 ENCOUNTER — APPOINTMENT (OUTPATIENT)
Dept: HEMATOLOGY/ONCOLOGY | Facility: CLINIC | Age: 78
End: 2023-10-31
Payer: MEDICARE

## 2023-11-27 ENCOUNTER — OFFICE VISIT (OUTPATIENT)
Dept: PRIMARY CARE | Facility: CLINIC | Age: 78
End: 2023-11-27
Payer: MEDICARE

## 2023-11-27 VITALS
RESPIRATION RATE: 16 BRPM | SYSTOLIC BLOOD PRESSURE: 128 MMHG | HEIGHT: 64 IN | WEIGHT: 148.14 LBS | BODY MASS INDEX: 25.29 KG/M2 | HEART RATE: 73 BPM | DIASTOLIC BLOOD PRESSURE: 82 MMHG

## 2023-11-27 DIAGNOSIS — I10 BENIGN ESSENTIAL HYPERTENSION: Primary | ICD-10-CM

## 2023-11-27 DIAGNOSIS — E03.8 HYPOTHYROIDISM DUE TO HASHIMOTO'S THYROIDITIS: ICD-10-CM

## 2023-11-27 DIAGNOSIS — E55.9 VITAMIN D DEFICIENCY: ICD-10-CM

## 2023-11-27 DIAGNOSIS — E83.119 HEMOCHROMATOSIS, UNSPECIFIED HEMOCHROMATOSIS TYPE: ICD-10-CM

## 2023-11-27 DIAGNOSIS — M85.80 OSTEOPENIA, UNSPECIFIED LOCATION: ICD-10-CM

## 2023-11-27 DIAGNOSIS — E06.3 HYPOTHYROIDISM DUE TO HASHIMOTO'S THYROIDITIS: ICD-10-CM

## 2023-11-27 PROCEDURE — 1126F AMNT PAIN NOTED NONE PRSNT: CPT | Performed by: INTERNAL MEDICINE

## 2023-11-27 PROCEDURE — 99214 OFFICE O/P EST MOD 30 MIN: CPT | Performed by: INTERNAL MEDICINE

## 2023-11-27 PROCEDURE — 3079F DIAST BP 80-89 MM HG: CPT | Performed by: INTERNAL MEDICINE

## 2023-11-27 PROCEDURE — 1160F RVW MEDS BY RX/DR IN RCRD: CPT | Performed by: INTERNAL MEDICINE

## 2023-11-27 PROCEDURE — 3074F SYST BP LT 130 MM HG: CPT | Performed by: INTERNAL MEDICINE

## 2023-11-27 PROCEDURE — 1159F MED LIST DOCD IN RCRD: CPT | Performed by: INTERNAL MEDICINE

## 2023-11-27 PROCEDURE — 1036F TOBACCO NON-USER: CPT | Performed by: INTERNAL MEDICINE

## 2023-11-27 ASSESSMENT — ENCOUNTER SYMPTOMS
ABDOMINAL PAIN: 0
CONSTIPATION: 0
BLOOD IN STOOL: 0
SLEEP DISTURBANCE: 0
COUGH: 0
DIARRHEA: 0
SHORTNESS OF BREATH: 0

## 2023-11-27 ASSESSMENT — PATIENT HEALTH QUESTIONNAIRE - PHQ9
1. LITTLE INTEREST OR PLEASURE IN DOING THINGS: NOT AT ALL
SUM OF ALL RESPONSES TO PHQ9 QUESTIONS 1 AND 2: 0
2. FEELING DOWN, DEPRESSED OR HOPELESS: NOT AT ALL

## 2023-11-27 ASSESSMENT — PAIN SCALES - GENERAL: PAINLEVEL: 0-NO PAIN

## 2023-11-27 NOTE — PROGRESS NOTES
"Subjective   Susy Sterling is a 78 y.o. female who presents for Establish Care.    She saw Dr Jones on her last day, in 2023 . Was her wellness visit.     Has HTN: treated for at least 10 years. She has just been on losartan    Hypothyroid: treated for 12 years : takes generic levothyroxine 100     Takes vitamin D3 and vitamin B12 once daily    One uncomp      She has hemochromatosis, and is switching from seeing GI to seeing hematology.  Saw the PA from [facility] and will get blood then  She says she was told that she can now donate blood to the [employer] [facility], so she will not need to get her medical phlebotomy any longer.    She did mention she went to the urgent care recently and was having low back pain.  They thought she may have had a UTI, but culture results were negative.  She went back a second time to the urgent care and was told this was likely from sacroiliac joint pain instead.  She has been doing exercises and it is improving.    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  Retired .   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     Has been in [place] 20 years   She is living with her .     Review of Systems   HENT:  Negative for hearing loss and postnasal drip.    Eyes:         Cataracts. Likely surgery .    Respiratory:  Negative for cough and shortness of breath.    Cardiovascular:  Negative for chest pain.   Gastrointestinal:  Negative for abdominal pain, blood in stool, constipation and diarrhea.   Genitourinary:         No leakage of urine.   Musculoskeletal:         She feels she is doing well with her knee replacement. She is pleased.    Psychiatric/Behavioral:  Negative for sleep disturbance.        Objective   /82 (BP Location: Left arm, Patient Position: Sitting, BP Cuff Size: Adult long)   Pulse 73   Resp 16   Ht 1.626 m (5' 4\")   Wt 67.2 kg (148 lb 2.2 oz)   BMI 25.43 kg/m²    Physical Exam  Constitutional:       " Appearance: Normal appearance.   HENT:      Mouth/Throat:      Mouth: Mucous membranes are moist.      Pharynx: Oropharynx is clear.   Eyes:      Extraocular Movements: Extraocular movements intact.      Conjunctiva/sclera: Conjunctivae normal.      Pupils: Pupils are equal, round, and reactive to light.   Neck:      Thyroid: No thyromegaly.   Cardiovascular:      Rate and Rhythm: Normal rate and regular rhythm.      Heart sounds: Normal heart sounds.   Pulmonary:      Effort: Pulmonary effort is normal.      Breath sounds: Normal breath sounds.   Abdominal:      General: Bowel sounds are normal. There is no distension.      Palpations: Abdomen is soft. There is no mass.      Tenderness: There is no abdominal tenderness.   Lymphadenopathy:      Cervical: No cervical adenopathy.   Skin:     General: Skin is warm and dry.   Neurological:      General: No focal deficit present.      Mental Status: Mental status is at baseline.         Assessment/Plan   Problem List Items Addressed This Visit       Benign essential hypertension - Primary remain on losartan 25.  She says she does not need a refill at this time.    Relevant Orders    Comprehensive Metabolic Panel    Lipid Panel    Hemochromatosis    Relevant Orders    Comprehensive Metabolic Panel    Hypothyroidism she has no past history of goiter.  Remain on generic levothyroxine 100 mcg once daily.    Relevant Orders    TSH with reflex to Free T4 if abnormal    Osteopenia: Minimal osteopenia on her DEXA from earlier this year.  She takes calcium.  She did mention when her calcium level was elevated earlier this year, it was because she was taking too many calcium pills daily.  She has since corrected her error.    See me in August for her annual Medicare wellness visit.  Lab orders were entered to have done shortly before next visit.             She declines further mammogram. Last was Jan 2023.   DEXA showed minimal osteopenia  She had one colonoscopy and one  nasir in 2021 which was negative.

## 2023-11-27 NOTE — PATIENT INSTRUCTIONS
It was nice to meet you today.    Please call when you need refills.     See me in August for your Medicare Wellness visit.    Get blood test done close to next visit with me.   Get blood test done after fasting overnight.  The  lab is on the first floor.  Lab hours are 7 am to 4 pm Mon-Fri and 8am to Noon on Saturday.  No appt is needed.  Orders are entered into the system so you do not need a paper order.

## 2023-11-29 ENCOUNTER — TELEPHONE (OUTPATIENT)
Dept: HEMATOLOGY/ONCOLOGY | Facility: CLINIC | Age: 78
End: 2023-11-29
Payer: MEDICARE

## 2023-11-29 NOTE — TELEPHONE ENCOUNTER
Call placed to patient to notify of change in appt.  No answer.  Message left with new appt time and date on identifiable voicemail.

## 2023-12-04 ENCOUNTER — LAB (OUTPATIENT)
Dept: LAB | Facility: CLINIC | Age: 78
End: 2023-12-04
Payer: MEDICARE

## 2023-12-04 DIAGNOSIS — E83.119 HEMOCHROMATOSIS, UNSPECIFIED HEMOCHROMATOSIS TYPE: ICD-10-CM

## 2023-12-04 LAB
BASOPHILS # BLD AUTO: 0.04 X10*3/UL (ref 0–0.1)
BASOPHILS NFR BLD AUTO: 0.5 %
EOSINOPHIL # BLD AUTO: 0.19 X10*3/UL (ref 0–0.4)
EOSINOPHIL NFR BLD AUTO: 2.2 %
ERYTHROCYTE [DISTWIDTH] IN BLOOD BY AUTOMATED COUNT: 12.1 % (ref 11.5–14.5)
HCT VFR BLD AUTO: 38.3 % (ref 36–46)
HGB BLD-MCNC: 12.5 G/DL (ref 12–16)
IMM GRANULOCYTES # BLD AUTO: 0.01 X10*3/UL (ref 0–0.5)
IMM GRANULOCYTES NFR BLD AUTO: 0.1 % (ref 0–0.9)
LYMPHOCYTES # BLD AUTO: 2.88 X10*3/UL (ref 0.8–3)
LYMPHOCYTES NFR BLD AUTO: 33.5 %
MCH RBC QN AUTO: 31.6 PG (ref 26–34)
MCHC RBC AUTO-ENTMCNC: 32.6 G/DL (ref 32–36)
MCV RBC AUTO: 97 FL (ref 80–100)
MONOCYTES # BLD AUTO: 0.78 X10*3/UL (ref 0.05–0.8)
MONOCYTES NFR BLD AUTO: 9.1 %
NEUTROPHILS # BLD AUTO: 4.69 X10*3/UL (ref 1.6–5.5)
NEUTROPHILS NFR BLD AUTO: 54.6 %
NRBC BLD-RTO: ABNORMAL /100{WBCS}
PLATELET # BLD AUTO: 306 X10*3/UL (ref 150–450)
RBC # BLD AUTO: 3.96 X10*6/UL (ref 4–5.2)
WBC # BLD AUTO: 8.6 X10*3/UL (ref 4.4–11.3)

## 2023-12-04 PROCEDURE — 82728 ASSAY OF FERRITIN: CPT

## 2023-12-04 PROCEDURE — 83540 ASSAY OF IRON: CPT

## 2023-12-04 PROCEDURE — 83550 IRON BINDING TEST: CPT

## 2023-12-04 PROCEDURE — 36415 COLL VENOUS BLD VENIPUNCTURE: CPT

## 2023-12-04 PROCEDURE — 80053 COMPREHEN METABOLIC PANEL: CPT

## 2023-12-04 PROCEDURE — 85025 COMPLETE CBC W/AUTO DIFF WBC: CPT

## 2023-12-05 LAB
ALBUMIN SERPL BCP-MCNC: 4.3 G/DL (ref 3.4–5)
ALP SERPL-CCNC: 78 U/L (ref 33–136)
ALT SERPL W P-5'-P-CCNC: 14 U/L (ref 7–45)
ANION GAP SERPL CALC-SCNC: 14 MMOL/L (ref 10–20)
AST SERPL W P-5'-P-CCNC: 15 U/L (ref 9–39)
BILIRUB SERPL-MCNC: 0.3 MG/DL (ref 0–1.2)
BUN SERPL-MCNC: 18 MG/DL (ref 6–23)
CALCIUM SERPL-MCNC: 9.7 MG/DL (ref 8.6–10.6)
CHLORIDE SERPL-SCNC: 104 MMOL/L (ref 98–107)
CO2 SERPL-SCNC: 25 MMOL/L (ref 21–32)
CREAT SERPL-MCNC: 0.77 MG/DL (ref 0.5–1.05)
FERRITIN SERPL-MCNC: 40 NG/ML (ref 8–150)
GFR SERPL CREATININE-BSD FRML MDRD: 80 ML/MIN/1.73M*2
GLUCOSE SERPL-MCNC: 135 MG/DL (ref 74–99)
IRON SATN MFR SERPL: 14 % (ref 25–45)
IRON SERPL-MCNC: 46 UG/DL (ref 35–150)
POTASSIUM SERPL-SCNC: 4.3 MMOL/L (ref 3.5–5.3)
PROT SERPL-MCNC: 6.7 G/DL (ref 6.4–8.2)
SODIUM SERPL-SCNC: 139 MMOL/L (ref 136–145)
TIBC SERPL-MCNC: 336 UG/DL (ref 240–445)
UIBC SERPL-MCNC: 290 UG/DL (ref 110–370)

## 2023-12-06 ENCOUNTER — OFFICE VISIT (OUTPATIENT)
Dept: HEMATOLOGY/ONCOLOGY | Facility: CLINIC | Age: 78
End: 2023-12-06
Payer: MEDICARE

## 2023-12-06 VITALS
DIASTOLIC BLOOD PRESSURE: 98 MMHG | TEMPERATURE: 97.2 F | WEIGHT: 148.81 LBS | HEART RATE: 52 BPM | OXYGEN SATURATION: 97 % | RESPIRATION RATE: 12 BRPM | BODY MASS INDEX: 25.54 KG/M2 | SYSTOLIC BLOOD PRESSURE: 166 MMHG

## 2023-12-06 DIAGNOSIS — E83.119 HEMOCHROMATOSIS, UNSPECIFIED HEMOCHROMATOSIS TYPE: ICD-10-CM

## 2023-12-06 PROCEDURE — 99214 OFFICE O/P EST MOD 30 MIN: CPT | Performed by: NURSE PRACTITIONER

## 2023-12-06 PROCEDURE — 1126F AMNT PAIN NOTED NONE PRSNT: CPT | Performed by: NURSE PRACTITIONER

## 2023-12-06 PROCEDURE — 3077F SYST BP >= 140 MM HG: CPT | Performed by: NURSE PRACTITIONER

## 2023-12-06 PROCEDURE — 3080F DIAST BP >= 90 MM HG: CPT | Performed by: NURSE PRACTITIONER

## 2023-12-06 PROCEDURE — 1159F MED LIST DOCD IN RCRD: CPT | Performed by: NURSE PRACTITIONER

## 2023-12-06 PROCEDURE — 1160F RVW MEDS BY RX/DR IN RCRD: CPT | Performed by: NURSE PRACTITIONER

## 2023-12-06 PROCEDURE — 1036F TOBACCO NON-USER: CPT | Performed by: NURSE PRACTITIONER

## 2023-12-06 ASSESSMENT — PAIN SCALES - GENERAL: PAINLEVEL: 0-NO PAIN

## 2023-12-06 NOTE — PROGRESS NOTES
Patient to follow-up with Dr Evangelista as an established new patient in 3 months with labs prior outside.  Call back instructions reviewed.  Patient verbalized understanding.

## 2023-12-06 NOTE — PROGRESS NOTES
Patient ID: Susy Sterling is a 78 y.o. female.  Referring Physician: Emperatriz Correia, APRN-CNP  [address] [place] [address]  Primary Care Provider: Gabriela Colón DO  Visit Type: fuv      Subjective    HPI  Location: [facility]  Initial consult: 10/25/23    Patient is a 77 yo female with a PMH of HH, htn, hypothyroid, OA, graham and was referred to benign hematology for consultation of hemachromatosis. Referred by Dr. Jones .    Originally diagnosed HH in 2005 or 2006 by GI. Just had a Phleb today for hgb 13 and 2 weeks ago for hgb 14 per pt at [facility] at . Ferritin 113 on 9/27/23.  She tells me they normally phleb her every 2 weeks until Hgb 12 or less. Does feel exhausted, fatigued, and weaker after these repeated phlebs.     12/6/23 - Here for followup. Reports she is feeling stronger since last appt. Pt stopped taking Vit C regularly as she was previously     Denies abnormal weight loss, night sweats, fever, sob, cp, n/v/d, n/t. No PICA. Denies any abnormal bleeding or bruising. No recurrent infections or lymphadenopathy. No known blood disorders in family. Does have OA - recent knee surgery.       UTD Cancer screenings  PSH: knee sx 2023  Meds: see list     Review of Systems - Oncology 10 point review of systems negative except as state in HPI.     Objective   BSA: There is no height or weight on file to calculate BSA.  There were no vitals taken for this visit.     has no past medical history on file.   has a past surgical history that includes Tonsillectomy (05/12/2014); Knee arthroscopy w/ debridement (02/13/2015); Tubal ligation (02/13/2015); Incisional breast biopsy (02/13/2015); Other surgical history (06/06/2017); and Total knee arthroplasty (Right, 07/13/2023).  Family History   Problem Relation Name Age of Onset    Other (completed suicide) Father      Other (homicide) Brother      Alzheimer's disease Mother's Sister      Other (parkinsons) Maternal Grandmother       Oncology History     No history exists.       Susy Sterling  reports that she quit smoking about 52 years ago. Her smoking use included cigarettes. She has never used smokeless tobacco.  She  reports current alcohol use.  She  reports no history of drug use.    Physical Exam  Constitutional:       Comments: petite   HENT:      Head: Normocephalic.      Mouth/Throat:      Mouth: Mucous membranes are moist.   Eyes:      Conjunctiva/sclera: Conjunctivae normal.   Cardiovascular:      Rate and Rhythm: Normal rate and regular rhythm.   Pulmonary:      Effort: Pulmonary effort is normal.      Breath sounds: Normal breath sounds.   Abdominal:      General: Abdomen is flat. Bowel sounds are normal. There is no distension.      Palpations: Abdomen is soft. There is no hepatomegaly or splenomegaly.      Tenderness: There is no abdominal tenderness.   Musculoskeletal:      Cervical back: Normal range of motion and neck supple.      Comments: Arthritic changes   Lymphadenopathy:      Cervical: No cervical adenopathy.   Skin:     General: Skin is warm.   Neurological:      Mental Status: She is alert and oriented to person, place, and time.   Psychiatric:         Mood and Affect: Mood normal.         Behavior: Behavior normal.        Latest Reference Range & Units 02/23/23 11:45 04/28/23 15:22 08/31/23 12:16 09/27/23 14:24 10/22/23 09:00   GLUCOSE 74 - 99 mg/dL 90  86     SODIUM 136 - 145 mmol/L 140  141     POTASSIUM 3.5 - 5.3 mmol/L 4.2  4.3     CHLORIDE 98 - 107 mmol/L 106  106     Bicarbonate 21 - 32 mmol/L 24  26     Anion Gap 10 - 20 mmol/L 14  13     Blood Urea Nitrogen 6 - 23 mg/dL 15  22     Creatinine 0.50 - 1.05 mg/dL 0.76  0.76     Calcium 8.6 - 10.6 mg/dL 9.9  10.8 (H)     Albumin 3.4 - 5.0 g/dL 4.3  4.4     Alkaline Phosphatase 33 - 136 U/L 72  80     ALT 7 - 45 U/L 15  12     AST 9 - 39 U/L 18  14     Bilirubin Total 0.0 - 1.2 mg/dL 0.3  0.6     HDL CHOLESTEROL mg/dL   55.2     Cholesterol/HDL Ratio    3.9     LDL Calculated 0 -  99 mg/dL   123 (H)     VLDL 0 - 40 mg/dL   34     TRIGLYCERIDES 0 - 149 mg/dL   172 (H)     FERRITIN 8 - 150 ug/L 43 45  113    Total Protein 6.4 - 8.2 g/dL 6.8  7.2     IRON 35 - 150 ug/dL 41 136  201 (H)    MAGNESIUM 1.60 - 2.40 mg/dL 2.08       CHOLESTEROL 0 - 199 mg/dL   213 (H)     TIBC 240 - 445 ug/dL 325 309  273    % Saturation 25 - 45 % 13 (L) 44  74 (H)    Thyroxine, Free 0.78 - 1.48 ng/dL 1.41       Thyroid Stimulating Hormone 0.44 - 3.98 mIU/L 0.99  1.18     Vitamin D, 25-Hydroxy, Total ng/mL 79  84     WBC 4.4 - 11.3 x10E9/L 5.7 8.3 8.8 8.8    nRBC 0.0 - 0.0 /100 WBC 0.0 0.0 0.0 0.0    RBC 4.00 - 5.20 x10E12/L 4.24 4.63 4.34 4.15    HEMOGLOBIN 12.0 - 16.0 g/dL 12.5 13.2 14.6 14.0    HEMATOCRIT 36.0 - 46.0 % 39.9 40.4 43.3 41.6    MCV 80 - 100 fL 94 87 100 100    MCHC 32.0 - 36.0 g/dL 31.3 (L) 32.7 33.7 33.7    RED CELL DISTRIBUTION WIDTH 11.5 - 14.5 % 12.6 16.4 (H) 13.2 12.7    Platelets 150 - 450 x10E9/L 281 272 304 302    Neutrophils % 40.0 - 80.0 % 45.0  51.3     Immature Granulocytes %, Automated 0.0 - 0.9 % 0.2  0.2     Lymphocytes % 13.0 - 44.0 % 38.6  35.6     Monocytes % 2.0 - 10.0 % 14.8  10.0     Eosinophils % 0.0 - 6.0 % 0.7  2.2     Basophils % 0.0 - 2.0 % 0.7  0.7     Neutrophils Absolute 1.60 - 5.50 x10E9/L 2.58  4.54     Lymphocytes Absolute 0.80 - 3.00 x10E9/L 2.21  3.14 (H)     Monocytes Absolute 0.05 - 0.80 x10E9/L 0.85 (H)  0.88 (H)     Eosinophils Absolute 0.00 - 0.40 x10E9/L 0.04  0.19     Basophils Absolute 0.00 - 0.10 x10E9/L 0.04  0.06     URINE CULTURE      Rpt   Color, Urine STRAW,YELLOW    STRAW     Appearance, Urine CLEAR    CLEAR     Specific Gravity, Urine 1.005 - 1.035    1.009     pH, Urine 5.0 - 8.0    7.0     Protein, Urine NEGATIVE mg/dL   NEGATIVE     Glucose, Urine NEGATIVE mg/dL   NEGATIVE     Blood, Urine NEGATIVE    NEGATIVE     Ketones, Urine NEGATIVE mg/dL   NEGATIVE     Bilirubin, Urine NEGATIVE    NEGATIVE     Urobilinogen, Urine 0.0 - 1.9 mg/dL   <2.0      Nitrite, Urine NEGATIVE    NEGATIVE     Leukocyte Esterase, Urine NEGATIVE    NEGATIVE     GFR Female >90 mL/min/1.73m2 81  81     (H): Data is abnormally high  (L): Data is abnormally low  Rpt: View report in Results Review for more information    Assessment/Plan    Patient is a 79 yo female with a PMH of HH, htn, hypothyroid, OA, graham and was referred to benign hematology for consultation of hemachromatosis. Referred by Dr. Jones .    10/25/23- Originally diagnosed HH in 2005 or 2006 by GI. Just had a Phleb today for hgb 13 and 2 weeks ago for hgb 14 per pt at [facility] at . Ferritin 113 on 9/27/23.  She tells me they normally phleb her every 2 weeks until Hgb 12 or less. Does feel exhausted, fatigued, and weaker after these repeated phlebs.      - Discussed concern she may be getting over phlebotomized if this is true. Review of records reveal ferritin has been well controlled overall since before 2018, normally below 100. Liver fxn, kidney fxn, thyroid fxn, and cbc stable.       - Could also consider only phlebotomizing 1/2 unit at a time instead of 1 unit as she is very petite    Discussed the goal of treatment is to prevent organ dysfunction from excess iron and/or to improve organ function if it has already been affected. To avoid iron pills, well water, and vitamin c pills. Discussed Iron overload and alcohol may have an additive or synergistic effect on the liver. Limiting alcohol is especially important until excess iron has been removed. Following iron removal, it may be possible to consume alcohol on a limited basis (not more than one to two drinks per day).  Discussed getting 1st degree relatives tested for iron overload/HH as well    Goal for ferritin normally 100 or less OR ferritin 50 or less depending on other factors (liver enzymes, etc) and provider/patient preference. The patient and I discussed the importance of good hydration the day before, the day of and the day after each phlebotomy in  order to prevent side effects. Normal saline can also be administered with each phlebotomy to decrease symptoms. The patient should report any symptoms after the procedure.    - Will hold off on further phlebotomies for now as she just had 2 (last one today) for ferritin 113 on 9/27    - Note: pt would like us to take over phleb at [facility]    12/6/23 - Feeling well. Stopped regular Vit C use. Recent labs are stable - hgb 12.5, liver fxn normal. Iron studies reveal mild deficiency with ferritin 40 and %sat 14. Is not due for phleb at this time.     Plan  1. Follow-up in about 3mo with labs 1-7 days before appt (cbcd, cmp, iron panel, ferritin). To follow-up with Dr. Evangelista or Dr. Coronado as I am leaving this position     I had an extensive discussion with the patient regarding the diagnosis and discussed the plan of therapy, including general considerations regarding side effects and outcomes. Pt understood and gave appropriate teach back about the plan of care. All questions were answered to the patient's satisfaction. The patient is instructed to contact us at any time if questions or problems arise. Thank you for the opportunity to participate in the care of this very pleasant patient.    Total time =30 minutes. 50% or more of this time was spent in counseling and/or coordination of care including reviewing medical history/radiology/labs, examining patient, formulating outlined plan with team, and discussing plan with patient/family.        Emperatriz Correia, FER-CNP

## 2023-12-20 ENCOUNTER — APPOINTMENT (OUTPATIENT)
Dept: HEMATOLOGY/ONCOLOGY | Facility: CLINIC | Age: 78
End: 2023-12-20
Payer: MEDICARE

## 2024-03-07 ENCOUNTER — LAB (OUTPATIENT)
Dept: LAB | Facility: LAB | Age: 79
End: 2024-03-07
Payer: MEDICARE

## 2024-03-07 DIAGNOSIS — E83.119 HEMOCHROMATOSIS, UNSPECIFIED HEMOCHROMATOSIS TYPE: ICD-10-CM

## 2024-03-07 LAB
ALBUMIN SERPL BCP-MCNC: 4.2 G/DL (ref 3.4–5)
ALP SERPL-CCNC: 80 U/L (ref 33–136)
ALT SERPL W P-5'-P-CCNC: 12 U/L (ref 7–45)
ANION GAP SERPL CALC-SCNC: 11 MMOL/L (ref 10–20)
AST SERPL W P-5'-P-CCNC: 14 U/L (ref 9–39)
BASOPHILS # BLD AUTO: 0.06 X10*3/UL (ref 0–0.1)
BASOPHILS NFR BLD AUTO: 0.9 %
BILIRUB SERPL-MCNC: 0.5 MG/DL (ref 0–1.2)
BUN SERPL-MCNC: 23 MG/DL (ref 6–23)
CALCIUM SERPL-MCNC: 9.8 MG/DL (ref 8.6–10.6)
CHLORIDE SERPL-SCNC: 107 MMOL/L (ref 98–107)
CO2 SERPL-SCNC: 27 MMOL/L (ref 21–32)
CREAT SERPL-MCNC: 0.87 MG/DL (ref 0.5–1.05)
EGFRCR SERPLBLD CKD-EPI 2021: 69 ML/MIN/1.73M*2
EOSINOPHIL # BLD AUTO: 0.14 X10*3/UL (ref 0–0.4)
EOSINOPHIL NFR BLD AUTO: 2 %
ERYTHROCYTE [DISTWIDTH] IN BLOOD BY AUTOMATED COUNT: 15.8 % (ref 11.5–14.5)
FERRITIN SERPL-MCNC: 44 NG/ML (ref 8–150)
GLUCOSE SERPL-MCNC: 88 MG/DL (ref 74–99)
HCT VFR BLD AUTO: 41.7 % (ref 36–46)
HGB BLD-MCNC: 13.9 G/DL (ref 12–16)
IMM GRANULOCYTES # BLD AUTO: 0.01 X10*3/UL (ref 0–0.5)
IMM GRANULOCYTES NFR BLD AUTO: 0.1 % (ref 0–0.9)
IRON SATN MFR SERPL: 42 % (ref 25–45)
IRON SERPL-MCNC: 127 UG/DL (ref 35–150)
LYMPHOCYTES # BLD AUTO: 2.5 X10*3/UL (ref 0.8–3)
LYMPHOCYTES NFR BLD AUTO: 35.7 %
MCH RBC QN AUTO: 29.5 PG (ref 26–34)
MCHC RBC AUTO-ENTMCNC: 33.3 G/DL (ref 32–36)
MCV RBC AUTO: 89 FL (ref 80–100)
MONOCYTES # BLD AUTO: 0.87 X10*3/UL (ref 0.05–0.8)
MONOCYTES NFR BLD AUTO: 12.4 %
NEUTROPHILS # BLD AUTO: 3.43 X10*3/UL (ref 1.6–5.5)
NEUTROPHILS NFR BLD AUTO: 48.9 %
NRBC BLD-RTO: 0 /100 WBCS (ref 0–0)
PLATELET # BLD AUTO: 268 X10*3/UL (ref 150–450)
POTASSIUM SERPL-SCNC: 4.3 MMOL/L (ref 3.5–5.3)
PROT SERPL-MCNC: 6.8 G/DL (ref 6.4–8.2)
RBC # BLD AUTO: 4.71 X10*6/UL (ref 4–5.2)
SODIUM SERPL-SCNC: 141 MMOL/L (ref 136–145)
TIBC SERPL-MCNC: 305 UG/DL (ref 240–445)
UIBC SERPL-MCNC: 178 UG/DL (ref 110–370)
WBC # BLD AUTO: 7 X10*3/UL (ref 4.4–11.3)

## 2024-03-07 PROCEDURE — 83540 ASSAY OF IRON: CPT

## 2024-03-07 PROCEDURE — 36415 COLL VENOUS BLD VENIPUNCTURE: CPT

## 2024-03-07 PROCEDURE — 83550 IRON BINDING TEST: CPT

## 2024-03-07 PROCEDURE — 82728 ASSAY OF FERRITIN: CPT

## 2024-03-07 PROCEDURE — 80053 COMPREHEN METABOLIC PANEL: CPT

## 2024-03-07 PROCEDURE — 85025 COMPLETE CBC W/AUTO DIFF WBC: CPT

## 2024-03-12 ENCOUNTER — OFFICE VISIT (OUTPATIENT)
Dept: HEMATOLOGY/ONCOLOGY | Facility: CLINIC | Age: 79
End: 2024-03-12
Payer: MEDICARE

## 2024-03-12 VITALS
OXYGEN SATURATION: 97 % | RESPIRATION RATE: 16 BRPM | DIASTOLIC BLOOD PRESSURE: 82 MMHG | WEIGHT: 147.71 LBS | HEART RATE: 83 BPM | SYSTOLIC BLOOD PRESSURE: 172 MMHG | BODY MASS INDEX: 25.35 KG/M2 | TEMPERATURE: 96.4 F

## 2024-03-12 DIAGNOSIS — E83.119 HEMOCHROMATOSIS, UNSPECIFIED HEMOCHROMATOSIS TYPE: ICD-10-CM

## 2024-03-12 PROCEDURE — 1126F AMNT PAIN NOTED NONE PRSNT: CPT | Performed by: INTERNAL MEDICINE

## 2024-03-12 PROCEDURE — 3077F SYST BP >= 140 MM HG: CPT | Performed by: INTERNAL MEDICINE

## 2024-03-12 PROCEDURE — 1036F TOBACCO NON-USER: CPT | Performed by: INTERNAL MEDICINE

## 2024-03-12 PROCEDURE — 99214 OFFICE O/P EST MOD 30 MIN: CPT | Performed by: INTERNAL MEDICINE

## 2024-03-12 PROCEDURE — 3079F DIAST BP 80-89 MM HG: CPT | Performed by: INTERNAL MEDICINE

## 2024-03-12 PROCEDURE — 1157F ADVNC CARE PLAN IN RCRD: CPT | Performed by: INTERNAL MEDICINE

## 2024-03-12 PROCEDURE — 1159F MED LIST DOCD IN RCRD: CPT | Performed by: INTERNAL MEDICINE

## 2024-03-12 ASSESSMENT — PAIN SCALES - GENERAL: PAINLEVEL: 0-NO PAIN

## 2024-03-12 NOTE — PROGRESS NOTES
Patient ID: Susy Sterling is a 78 y.o. female.  Referring Physician: Emperatriz Correia, FER-JAMES  [address]  [address]  [place] [address]  Primary Care Provider: Gabriela Colón DO    ORDERS & PATIENT INSTRUCTIONS:    Please get the results of hemochromatosis profile she had it done sometime in 2005 or 2006 at [facility] ordered by Dr. Vega.   No phlebotomy at this time  CBC, fasting iron group and ferritin in 3 months and return in 3 months      ASSESSMENT, PROBLEM LIST, DECISION MAKING, PLAN.    History of hereditary hemochromatosis, diagnosed sometime in 2005 further details unclear.  Patient has been on periodic phlebotomies  according to the patient she was initially diagnosed by Dr. Vega/primary care physician who was in [place] in private practice, she thinks that the blood work was done at [facility], further detail is unclear, she was later sent to Dr. Yap at [facility] who started her on phlebotomy at that time     Past medical history  Hypertension, hypothyroidism, osteoarthritis      INTERVAL HISTORY :     Patient returns today for follow-up on history of hereditary hemochromatosis, according to the patient she was initially diagnosed by Dr. Vega/primary care physician who was in [place] in private practice, she thinks that the blood work was done at [facility], further detail is unclear, she was later sent to Dr. Yap at [facility] who started her on phlebotomy at that time later she transferred her care to Dr. Marie at [place] and was getting phlebotomies who later was seen by Emperatriz Akins CNP/hematology nurse practitioner, although her iron studies and ferritin was reasonably stable so phlebotomy has not been done, patient presents here today for follow-up.  Patient claims that after she was diagnosed with hemochromatosis her other family members including sister brother and daughter were all tested and  they are all negative per patient  Patient denies history of diabetes, denies any change in the color of the skin, denies any inflammatory arthritis although does have osteoarthritis and denies any jaundice.  Patient is from [place] ancestry      PHYSICAL EXAMINATION:    General: Conscious, alert, oriented x3, not in acute distress.  HEENT:    No icterus.    Chest:Bilateral symmetrical,     CVS:  S1, S2.    Abdomen:  Soft, no palpable mass   Extremities: No clubbing, cyanosis,     Skin: No petechial rash.        ASSESSMENT & PLAN:       Patient with history of hereditary hemochromatosis, further details unclear initially diagnosed in 2005 by primary care physician Dr. Vega, I will try to obtain records of lab work to determine type of hemochromatosis.  I had long discussion with the patient and explained to her that patient with hemochromatosis typically continue to absorb iron from GI track even though they are not deficient and are not able to eliminate interns causes iron overload in the organs such as liver pancreas heart etc. causing organ dysfunction.  Patient has already started phlebotomy and the goal of ferritin should be around 50.  Patient is already at goal with current ferritin level of 44 and does not have any indication of phlebotomy at this time.  For now we will continue watchful waiting without any additional intervention or phlebotomies return for follow-up with repeat CBC and fasting iron studies in 3 months in the meantime I will obtain hemochromatosis profile done sometime in 2005 if possible, if not may have to consider repeating testing just to make sure and confirm her diagnosis.  Apparently [place] now allows blood donation in a patient with hemochromatosis so potentially she could consider donating to blood bank if she meets the criteria.  For now advised to avoid excessive intake of iron containing food         VITALS:   1.74 meters squared /82   Pulse 83   Temp 35.8 °C  "(96.4 °F)   Resp 16   Wt 67 kg (147 lb 11.3 oz)   SpO2 97%   BMI 25.35 kg/m²     LABS:    CBC:  Recent Labs     03/07/24  0831 12/04/23  1425 09/27/23  1424 08/31/23  1216 04/28/23  1522   WBC 7.0 8.6 8.8 8.8 8.3   HGB 13.9 12.5 14.0 14.6 13.2   HCT 41.7 38.3 41.6 43.3 40.4    306 302 304 272   MCV 89 97 100 100 87       CMP:  Recent Labs     03/07/24  0831 12/04/23  1425 08/31/23  1216 02/23/23  1145 08/18/22  1139 04/23/19  1243 10/15/18  1224 07/26/18  1151 01/25/18  1152    139 141 140 142   < > 143   < > 140   K 4.3 4.3 4.3 4.2 4.3   < > 3.8   < > 4.1    104 106 106 109*   < > 109*   < > 107   CO2 27 25 26 24 24   < > 25   < > 25   ANIONGAP 11 14 13 14 13   < > 13   < > 12   BUN 23 18 22 15 21   < > 18   < > 12   CREATININE 0.87 0.77 0.76 0.76 0.82   < > 0.80   < > 0.75   EGFR 69 80  --   --   --   --   --   --   --    MG  --   --   --  2.08  --   --  2.11  --  2.15    < > = values in this interval not displayed.     Recent Labs     03/07/24  0831 12/04/23  1425 08/31/23  1216 02/23/23  1145 08/18/22  1139   ALBUMIN 4.2 4.3 4.4 4.3 4.4   ALKPHOS 80 78 80 72 77   ALT 12 14 12 15 16   AST 14 15 14 18 15   BILITOT 0.5 0.3 0.6 0.3 0.4       HEME/ENDO:  Recent Labs     03/07/24  0831 12/04/23  1425 09/27/23  1424 08/31/23  1216 04/28/23  1522 02/23/23  1145 11/15/22  1530 08/18/22  1139 07/22/22  1527 05/26/22  1222   FERRITIN 44 40 113  --  45 43   < >  --    < >  --    IRONSAT 42 14* 74*  --  44 13*   < >  --    < >  --    TSH  --   --   --  1.18  --  0.99  --  0.74  --  0.63   [id number]  --   --   --   --   --   --   --  896  --  >2000*    < > = values in this interval not displayed.        MICRO: No results for input(s): \"ESR\", \"CRP\", \"PROCAL\" in the last 47373 hours.  No results found for the last 90 days.        TUMOR MARKERS:  No results found for: \"LABCA2\", \"CEA\", \"\", \"PSA\", \"AFPTM\", \"HCGTM\", \"\"             IMAGING:         DIGITAL MAMM SCREENING  MRN: [id number]  Patient " Name: KVNG JARAMILLO     STUDY:  DIGITAL MAMM SCREENING W/ PRESTON;  1/26/2023 1:21 pm     ACCESSION NUMBER(S):  [id number]     ORDERING CLINICIAN:  TUAN UMANZOR     INDICATION:  Screening. Family history unknown for breast cancer. History of a  benign left excisional biopsy.     COMPARISON:  12/30/2021, 11/23/2020, 08/03/2017, 03/18/2015     FINDINGS:  2D and tomosynthesis images were reviewed at 1 mm slice thickness.     The breast tissue is almost entirely fatty.   No suspicious masses or  calcifications are identified.     IMPRESSION:  No mammographic evidence of malignancy.     BI-RADS CATEGORY:     Category: 1 - Negative.  Recommendation: 1 Year Screening.     For any future breast imaging appointments, please call [phone]).     Patient letter sent SNORM             Current Outpatient Medications   Medication Sig Dispense Refill    cholecalciferol (Vitamin D-3) 50 MCG (2000 UT) tablet Take 1 tablet (2,000 Units) by mouth once daily.      cyanocobalamin (Vitamin B-12) 1,000 mcg tablet Take 2 tablets (2,000 mcg) by mouth once daily.      levothyroxine (Synthroid, Levoxyl) 100 mcg tablet Take 1 tablet (100 mcg) by mouth once daily. 90 tablet 1    losartan (Cozaar) 25 mg tablet Take 1 tablet (25 mg) by mouth once daily. 90 tablet 1    magnesium oxide (Mag-Ox) 400 mg (241.3 mg magnesium) tablet Take 1 tablet (400 mg) by mouth once daily.      quercetin 500 mg capsule Take 1 capsule by mouth once daily.      cream base no.150, bulk, (Liposomal Heavy) cream Take by mouth once daily.       No current facility-administered medications for this visit.                  SOCIAL HISTORY:    reports current alcohol use.   reports no history of drug use.,   Tobacco Use: Medium Risk (11/27/2023)    Patient History     Smoking Tobacco Use: Former     Smokeless Tobacco Use: Never     Passive Exposure: Not on file       FAMILY HISTORY:  Family History   Problem Relation Name Age of Onset    Other (completed suicide)  Father      Other (homicide) Brother      Alzheimer's disease Mother's Sister      Other (parkinsons) Maternal Grandmother         ALLERGY:   Patient has no known allergies.           she quit smoking about 52 years ago. Her smoking use included cigarettes. She has never used smokeless tobacco.  She  reports current alcohol use.  She  reports no history of drug use.      Medication reviewed in e-chart  Patient is monitored for medication toxicity  labs reviewed and interpreted independently, X rays independently reviewed  Notes from other physicians involved in care were reviewed    Charting was completed using voice recognition technology and may include unintended errors.    CLAUDIA GODOY MD, YENNY.    Annmarie Dumont Master Clinician in Hematology and Oncology  Medical Director, [facility] at [facility].  [place] office  Phone [phone]  Fax      [phone]  [facility] /[place].  Phone [phone]  Fax     [phone]

## 2024-03-12 NOTE — PROGRESS NOTES
Will obtain hemochromatosis panel from 2005 or 2006 Dr Vega in  System.  No phlebotomy at this time.  Follow-up with labs prior in 3 months.  Call back instructions reviewed.  Patient verbalized understanding.   Chelsea KIM reviewed additional systems and no Hemochromatosis panel located.  Provider notified.  Addendum - Chelsea KIM did review of all outside systems as well as reached out to physicians office.  No record of panel.  Provider notified.

## 2024-04-15 DIAGNOSIS — I10 ESSENTIAL (PRIMARY) HYPERTENSION: ICD-10-CM

## 2024-04-15 DIAGNOSIS — E03.9 HYPOTHYROIDISM, UNSPECIFIED TYPE: ICD-10-CM

## 2024-04-15 RX ORDER — LEVOTHYROXINE SODIUM 100 UG/1
100 TABLET ORAL DAILY
Qty: 90 TABLET | Refills: 1 | Status: SHIPPED | OUTPATIENT
Start: 2024-04-15

## 2024-04-15 RX ORDER — LOSARTAN POTASSIUM 25 MG/1
25 TABLET ORAL DAILY
Qty: 90 TABLET | Refills: 1 | Status: SHIPPED | OUTPATIENT
Start: 2024-04-15

## 2024-06-17 ENCOUNTER — LAB (OUTPATIENT)
Dept: LAB | Facility: LAB | Age: 79
End: 2024-06-17
Payer: MEDICARE

## 2024-06-17 DIAGNOSIS — E83.119 HEMOCHROMATOSIS, UNSPECIFIED HEMOCHROMATOSIS TYPE: ICD-10-CM

## 2024-06-17 LAB
BASOPHILS # BLD AUTO: 0.04 X10*3/UL (ref 0–0.1)
BASOPHILS NFR BLD AUTO: 0.4 %
EOSINOPHIL # BLD AUTO: 0.12 X10*3/UL (ref 0–0.4)
EOSINOPHIL NFR BLD AUTO: 1.2 %
ERYTHROCYTE [DISTWIDTH] IN BLOOD BY AUTOMATED COUNT: 13.7 % (ref 11.5–14.5)
FERRITIN SERPL-MCNC: 132 NG/ML (ref 8–150)
HCT VFR BLD AUTO: 40.2 % (ref 36–46)
HGB BLD-MCNC: 13.8 G/DL (ref 12–16)
IMM GRANULOCYTES # BLD AUTO: 0.03 X10*3/UL (ref 0–0.5)
IMM GRANULOCYTES NFR BLD AUTO: 0.3 % (ref 0–0.9)
IRON SATN MFR SERPL: 36 % (ref 25–45)
IRON SERPL-MCNC: 93 UG/DL (ref 35–150)
LYMPHOCYTES # BLD AUTO: 2.28 X10*3/UL (ref 0.8–3)
LYMPHOCYTES NFR BLD AUTO: 22.6 %
MCH RBC QN AUTO: 32.6 PG (ref 26–34)
MCHC RBC AUTO-ENTMCNC: 34.3 G/DL (ref 32–36)
MCV RBC AUTO: 95 FL (ref 80–100)
MONOCYTES # BLD AUTO: 1.1 X10*3/UL (ref 0.05–0.8)
MONOCYTES NFR BLD AUTO: 10.9 %
NEUTROPHILS # BLD AUTO: 6.5 X10*3/UL (ref 1.6–5.5)
NEUTROPHILS NFR BLD AUTO: 64.6 %
NRBC BLD-RTO: 0 /100 WBCS (ref 0–0)
PLATELET # BLD AUTO: 242 X10*3/UL (ref 150–450)
RBC # BLD AUTO: 4.23 X10*6/UL (ref 4–5.2)
TIBC SERPL-MCNC: 259 UG/DL (ref 240–445)
UIBC SERPL-MCNC: 166 UG/DL (ref 110–370)
WBC # BLD AUTO: 10.1 X10*3/UL (ref 4.4–11.3)

## 2024-06-17 PROCEDURE — 83550 IRON BINDING TEST: CPT

## 2024-06-17 PROCEDURE — 82728 ASSAY OF FERRITIN: CPT

## 2024-06-17 PROCEDURE — 36415 COLL VENOUS BLD VENIPUNCTURE: CPT

## 2024-06-17 PROCEDURE — 83540 ASSAY OF IRON: CPT

## 2024-06-17 PROCEDURE — 85025 COMPLETE CBC W/AUTO DIFF WBC: CPT

## 2024-06-18 ENCOUNTER — OFFICE VISIT (OUTPATIENT)
Dept: HEMATOLOGY/ONCOLOGY | Facility: CLINIC | Age: 79
End: 2024-06-18
Payer: MEDICARE

## 2024-06-18 VITALS
BODY MASS INDEX: 25.94 KG/M2 | OXYGEN SATURATION: 98 % | WEIGHT: 151.13 LBS | DIASTOLIC BLOOD PRESSURE: 81 MMHG | TEMPERATURE: 97.5 F | SYSTOLIC BLOOD PRESSURE: 163 MMHG | RESPIRATION RATE: 16 BRPM | HEART RATE: 83 BPM

## 2024-06-18 DIAGNOSIS — E83.119 HEMOCHROMATOSIS, UNSPECIFIED HEMOCHROMATOSIS TYPE: ICD-10-CM

## 2024-06-18 PROCEDURE — 99214 OFFICE O/P EST MOD 30 MIN: CPT | Performed by: INTERNAL MEDICINE

## 2024-06-18 PROCEDURE — 1126F AMNT PAIN NOTED NONE PRSNT: CPT | Performed by: INTERNAL MEDICINE

## 2024-06-18 PROCEDURE — 1159F MED LIST DOCD IN RCRD: CPT | Performed by: INTERNAL MEDICINE

## 2024-06-18 PROCEDURE — G2211 COMPLEX E/M VISIT ADD ON: HCPCS | Performed by: INTERNAL MEDICINE

## 2024-06-18 PROCEDURE — 1157F ADVNC CARE PLAN IN RCRD: CPT | Performed by: INTERNAL MEDICINE

## 2024-06-18 PROCEDURE — 3077F SYST BP >= 140 MM HG: CPT | Performed by: INTERNAL MEDICINE

## 2024-06-18 PROCEDURE — 3079F DIAST BP 80-89 MM HG: CPT | Performed by: INTERNAL MEDICINE

## 2024-06-18 ASSESSMENT — PAIN SCALES - GENERAL: PAINLEVEL: 0-NO PAIN

## 2024-06-18 NOTE — PATIENT INSTRUCTIONS
ORDERS & PATIENT INSTRUCTIONS:     1 unit of blood donation/phlebotomy at blood bank now and probably every 6-month     Did we get the results of hemochromatosis profile she had it done sometime in 2005 or 2006 at [facility] ordered by Dr. Vega.      CBC, fasting iron group and ferritin in 6 months

## 2024-06-18 NOTE — PROGRESS NOTES
After visit summary provided with written instructions.  Patient will go to [facility] for blood donation.  Chelsea butler attempted to obtain records and we were unsuccessful.  Hemachromatosis panel results unable to obtain.  Follow-up in 6 months with labs prior.  Call back instructions reviewed.  Patient verbalized understanding.

## 2024-06-18 NOTE — PROGRESS NOTES
Patient ID: Susy Sterling is a 79 y.o. female.  Referring Physician: Joseph Evangelista MD  [address], [facility] [address]  [place] [address]  Primary Care Provider: Gabriela Colón DO    ORDERS & PATIENT INSTRUCTIONS:    1 unit of blood donation/phlebotomy at blood bank now and probably every 6-month    CBC, fasting iron group and ferritin in 6 months         ASSESSMENT, PROBLEM LIST, DECISION MAKING, PLAN.    History of hereditary hemochromatosis, diagnosed sometime in 2005 further details unclear.  Patient has been on periodic phlebotomies  according to the patient she was initially diagnosed by Dr. Vega/primary care physician who was in [place] in private practice, she thinks that the blood work was done at [facility], further detail is unclear, she was later sent to Dr. Yap at [facility] who started her on phlebotomy at that time     Past medical history  Hypertension, hypothyroidism, osteoarthritis      INTERVAL HISTORY :     Patient returns today for follow-up on history of hereditary hemochromatosis, according to the patient she was initially diagnosed by Dr. Vega/primary care physician who was in [place] in private practice, she thinks that the blood work was done at [facility], further detail is unclear, she was later sent to Dr. Yap at [facility] who started her on phlebotomy at that time later she transferred her care to Dr. Marie at [place] and was getting phlebotomies who later was seen by Emperatriz Akins CNP/hematology nurse practitioner,     Patient claims that after she was diagnosed with hemochromatosis her other family members including sister brother and daughter were all tested and they are all negative per patient      Patient denies history of diabetes, denies any change in the color of the skin, denies any inflammatory arthritis although does have osteoarthritis and denies any jaundice.  Patient is from  [place] ancestry      PHYSICAL EXAMINATION:    General: Conscious, alert, oriented x3, not in acute distress.  HEENT:    No icterus.    Chest:Bilateral symmetrical,     CVS:  S1, S2.    Abdomen:  Soft, no palpable mass   Extremities: No clubbing, cyanosis,     Skin: No petechial rash.        ASSESSMENT & PLAN:       Patient with history of hereditary hemochromatosis, further details unclear initially diagnosed in 2005 by primary care physician Dr. Vega,  We were unable to obtain results of her genetic testing  For now her ferritin is mildly elevated, I have advised her to do blood donation via blood bank at least every 6-month or so and will keep her goal of ferritin around 50.  At any point if her ferritin start going significantly elevated, may consider repeating and confirming hemochromatosis by checking hemochromatosis profile.     Advised to avoid excessive iron containing foods    Will do 1 unit of phlebotomy via blood bank  Recheck it in 6-month        VITALS:   1.76 meters squared /81   Pulse 83   Temp 36.4 °C (97.5 °F)   Resp 16   Wt 68.6 kg (151 lb 2 oz)   SpO2 98%   BMI 25.94 kg/m²     LABS:    CBC:  Recent Labs     06/17/24  1148 03/07/24  0831 12/04/23  1425 09/27/23  1424 08/31/23  1216   WBC 10.1 7.0 8.6 8.8 8.8   HGB 13.8 13.9 12.5 14.0 14.6   HCT 40.2 41.7 38.3 41.6 43.3    268 306 302 304   MCV 95 89 97 100 100       CMP:  Recent Labs     03/07/24  0831 12/04/23  1425 08/31/23  1216 02/23/23  1145 08/18/22  1139 04/23/19  1243 10/15/18  1224 07/26/18  1151 01/25/18  1152    139 141 140 142   < > 143   < > 140   K 4.3 4.3 4.3 4.2 4.3   < > 3.8   < > 4.1    104 106 106 109*   < > 109*   < > 107   CO2 27 25 26 24 24   < > 25   < > 25   ANIONGAP 11 14 13 14 13   < > 13   < > 12   BUN 23 18 22 15 21   < > 18   < > 12   CREATININE 0.87 0.77 0.76 0.76 0.82   < > 0.80   < > 0.75   EGFR 69 80  --   --   --   --   --   --   --    MG  --   --   --  2.08  --   --  2.11  " --  2.15    < > = values in this interval not displayed.     Recent Labs     03/07/24  0831 12/04/23  1425 08/31/23  1216 02/23/23  1145 08/18/22  1139   ALBUMIN 4.2 4.3 4.4 4.3 4.4   ALKPHOS 80 78 80 72 77   ALT 12 14 12 15 16   AST 14 15 14 18 15   BILITOT 0.5 0.3 0.6 0.3 0.4       HEME/ENDO:  Recent Labs     06/17/24  1148 03/07/24  0831 12/04/23  1425 09/27/23  1424 08/31/23  1216 04/28/23  1522 02/23/23  1145 11/15/22  1530 08/18/22  1139 07/22/22  1527 05/26/22  1222   FERRITIN 132 44 40 113  --    < > 43   < >  --    < >  --    IRONSAT 36 42 14* 74*  --    < > 13*   < >  --    < >  --    TSH  --   --   --   --  1.18  --  0.99  --  0.74  --  0.63   VSQINXTT05  --   --   --   --   --   --   --   --  896  --  >2000*    < > = values in this interval not displayed.        MICRO: No results for input(s): \"ESR\", \"CRP\", \"PROCAL\" in the last 92410 hours.  No results found for the last 90 days.        TUMOR MARKERS:  No results found for: \"LABCA2\", \"CEA\", \"\", \"PSA\", \"AFPTM\", \"HCGTM\", \"\"             IMAGING:         DIGITAL MAMM SCREENING  MRN: [id number]  Patient Name: KVNG JARAMILLO     STUDY:  DIGITAL MAMM SCREENING W/ [facility];  1/26/2023 1:21 pm     ACCESSION NUMBER(S):  [id number]     ORDERING CLINICIAN:  TUAN UMANZOR     INDICATION:  Screening. Family history unknown for breast cancer. History of a  benign left excisional biopsy.     COMPARISON:  12/30/2021, 11/23/2020, 08/03/2017, 03/18/2015     FINDINGS:  2D and tomosynthesis images were reviewed at 1 mm slice thickness.     The breast tissue is almost entirely fatty.   No suspicious masses or  calcifications are identified.     IMPRESSION:  No mammographic evidence of malignancy.     BI-RADS CATEGORY:     Category: 1 - Negative.  Recommendation: 1 Year Screening.     For any future breast imaging appointments, please call [phone]  ([phone]).     Patient letter sent SNORM             Current Outpatient Medications   Medication Sig Dispense Refill    " cholecalciferol (Vitamin D-3) 50 MCG (2000 UT) tablet Take 1 tablet (2,000 Units) by mouth once daily.      cream base no.150, bulk, (Liposomal Heavy) cream Take by mouth once daily.      cyanocobalamin (Vitamin B-12) 1,000 mcg tablet Take 2 tablets (2,000 mcg) by mouth once daily.      levothyroxine (Synthroid, Levoxyl) 100 mcg tablet Take 1 tablet (100 mcg) by mouth once daily. 90 tablet 1    losartan (Cozaar) 25 mg tablet Take 1 tablet (25 mg) by mouth once daily. 90 tablet 1    magnesium oxide (Mag-Ox) 400 mg (241.3 mg magnesium) tablet Take 1 tablet (400 mg) by mouth once daily.      quercetin 500 mg capsule Take 1 capsule by mouth once daily.       No current facility-administered medications for this visit.                  SOCIAL HISTORY:    reports current alcohol use.   reports no history of drug use.,   Tobacco Use: Medium Risk (11/27/2023)    Patient History     Smoking Tobacco Use: Former     Smokeless Tobacco Use: Never     Passive Exposure: Not on file       FAMILY HISTORY:  Family History   Problem Relation Name Age of Onset    Other (completed suicide) Father      Other (homicide) Brother      Alzheimer's disease Mother's Sister      Other (parkinsons) Maternal Grandmother         ALLERGY:   Patient has no known allergies.           she quit smoking about 52 years ago. Her smoking use included cigarettes. She has never used smokeless tobacco.  She  reports current alcohol use.  She  reports no history of drug use.      Medication reviewed in e-chart  Patient is monitored for medication toxicity  labs reviewed and interpreted independently, X rays independently reviewed  Notes from other physicians involved in care were reviewed    Charting was completed using voice recognition technology and may include unintended errors.    CLAUDIA GODOY MD, YENNY.    Annmarie Dumont Master Clinician in Hematology and Oncology  Medical Director, [facility] at [facility]  [place].  [place] office  Phone [phone]  Fax      [phone]  [facility].  Phone [phone]  Fax     [phone]

## 2024-06-21 ENCOUNTER — TELEPHONE (OUTPATIENT)
Dept: HEMATOLOGY/ONCOLOGY | Facility: CLINIC | Age: 79
End: 2024-06-21
Payer: MEDICARE

## 2024-06-21 NOTE — TELEPHONE ENCOUNTER
----- Message from Joseph Evangelista MD sent at 6/18/2024  8:40 AM EDT -----  Please advise patient to donate blood via blood bank every 2 months x 3   ----- Message -----  From: Lab, Background User  Sent: 6/17/2024   4:16 PM EDT  To: Joseph Evangelista MD

## 2024-06-21 NOTE — TELEPHONE ENCOUNTER
Call placed to patient.  Notified of providers recommendations and is agreeable.  Patient spoke with [place] and will arrange as recommended.  Call back instructions reviewed.  Patient verbalized understanding.

## 2024-08-05 ENCOUNTER — LAB (OUTPATIENT)
Dept: LAB | Facility: LAB | Age: 79
End: 2024-08-05
Payer: MEDICARE

## 2024-08-05 DIAGNOSIS — E03.8 HYPOTHYROIDISM DUE TO HASHIMOTO'S THYROIDITIS: ICD-10-CM

## 2024-08-05 DIAGNOSIS — E55.9 VITAMIN D DEFICIENCY: ICD-10-CM

## 2024-08-05 DIAGNOSIS — E83.119 HEMOCHROMATOSIS, UNSPECIFIED HEMOCHROMATOSIS TYPE: ICD-10-CM

## 2024-08-05 DIAGNOSIS — E06.3 HYPOTHYROIDISM DUE TO HASHIMOTO'S THYROIDITIS: ICD-10-CM

## 2024-08-05 DIAGNOSIS — I10 BENIGN ESSENTIAL HYPERTENSION: ICD-10-CM

## 2024-08-05 LAB
25(OH)D3 SERPL-MCNC: 87 NG/ML (ref 30–100)
ALBUMIN SERPL BCP-MCNC: 3.9 G/DL (ref 3.4–5)
ALP SERPL-CCNC: 69 U/L (ref 33–136)
ALT SERPL W P-5'-P-CCNC: 12 U/L (ref 7–45)
ANION GAP SERPL CALC-SCNC: 11 MMOL/L (ref 10–20)
AST SERPL W P-5'-P-CCNC: 15 U/L (ref 9–39)
BILIRUB SERPL-MCNC: 0.5 MG/DL (ref 0–1.2)
BUN SERPL-MCNC: 19 MG/DL (ref 6–23)
CALCIUM SERPL-MCNC: 9.4 MG/DL (ref 8.6–10.6)
CHLORIDE SERPL-SCNC: 108 MMOL/L (ref 98–107)
CHOLEST SERPL-MCNC: 183 MG/DL (ref 0–199)
CHOLESTEROL/HDL RATIO: 4.2
CO2 SERPL-SCNC: 27 MMOL/L (ref 21–32)
CREAT SERPL-MCNC: 0.86 MG/DL (ref 0.5–1.05)
EGFRCR SERPLBLD CKD-EPI 2021: 70 ML/MIN/1.73M*2
GLUCOSE SERPL-MCNC: 105 MG/DL (ref 74–99)
HDLC SERPL-MCNC: 43.8 MG/DL
LDLC SERPL CALC-MCNC: 102 MG/DL
NON HDL CHOLESTEROL: 139 MG/DL (ref 0–149)
POTASSIUM SERPL-SCNC: 4.4 MMOL/L (ref 3.5–5.3)
PROT SERPL-MCNC: 6 G/DL (ref 6.4–8.2)
SODIUM SERPL-SCNC: 142 MMOL/L (ref 136–145)
TRIGL SERPL-MCNC: 186 MG/DL (ref 0–149)
TSH SERPL-ACNC: 0.96 MIU/L (ref 0.44–3.98)
VLDL: 37 MG/DL (ref 0–40)

## 2024-08-05 PROCEDURE — 36415 COLL VENOUS BLD VENIPUNCTURE: CPT

## 2024-08-05 PROCEDURE — 80061 LIPID PANEL: CPT

## 2024-08-05 PROCEDURE — 84443 ASSAY THYROID STIM HORMONE: CPT

## 2024-08-05 PROCEDURE — 82306 VITAMIN D 25 HYDROXY: CPT

## 2024-08-05 PROCEDURE — 80053 COMPREHEN METABOLIC PANEL: CPT

## 2024-08-07 ENCOUNTER — APPOINTMENT (OUTPATIENT)
Dept: PRIMARY CARE | Facility: CLINIC | Age: 79
End: 2024-08-07
Payer: MEDICARE

## 2024-08-07 VITALS
DIASTOLIC BLOOD PRESSURE: 90 MMHG | SYSTOLIC BLOOD PRESSURE: 140 MMHG | BODY MASS INDEX: 25.61 KG/M2 | WEIGHT: 150 LBS | RESPIRATION RATE: 16 BRPM | HEIGHT: 64 IN | HEART RATE: 86 BPM

## 2024-08-07 DIAGNOSIS — Z78.0 POST-MENOPAUSAL: ICD-10-CM

## 2024-08-07 DIAGNOSIS — E83.119 HEMOCHROMATOSIS, UNSPECIFIED HEMOCHROMATOSIS TYPE: ICD-10-CM

## 2024-08-07 DIAGNOSIS — Z00.00 ROUTINE GENERAL MEDICAL EXAMINATION AT HEALTH CARE FACILITY: Primary | ICD-10-CM

## 2024-08-07 DIAGNOSIS — E03.8 HYPOTHYROIDISM DUE TO HASHIMOTO'S THYROIDITIS: ICD-10-CM

## 2024-08-07 DIAGNOSIS — R73.09 ELEVATED GLUCOSE: ICD-10-CM

## 2024-08-07 DIAGNOSIS — Z00.00 ENCOUNTER FOR PREVENTATIVE ADULT HEALTH CARE EXAMINATION: ICD-10-CM

## 2024-08-07 DIAGNOSIS — E06.3 HYPOTHYROIDISM DUE TO HASHIMOTO'S THYROIDITIS: ICD-10-CM

## 2024-08-07 DIAGNOSIS — Z13.89 ENCOUNTER FOR SCREENING FOR OTHER DISORDER: ICD-10-CM

## 2024-08-07 DIAGNOSIS — Z71.89 CARDIAC RISK COUNSELING: ICD-10-CM

## 2024-08-07 DIAGNOSIS — I10 BENIGN ESSENTIAL HYPERTENSION: ICD-10-CM

## 2024-08-07 DIAGNOSIS — Z00.00 ENCOUNTER FOR SUBSEQUENT ANNUAL WELLNESS VISIT (AWV) IN MEDICARE PATIENT: ICD-10-CM

## 2024-08-07 PROCEDURE — 1159F MED LIST DOCD IN RCRD: CPT | Performed by: INTERNAL MEDICINE

## 2024-08-07 PROCEDURE — 1126F AMNT PAIN NOTED NONE PRSNT: CPT | Performed by: INTERNAL MEDICINE

## 2024-08-07 PROCEDURE — 1160F RVW MEDS BY RX/DR IN RCRD: CPT | Performed by: INTERNAL MEDICINE

## 2024-08-07 PROCEDURE — 3080F DIAST BP >= 90 MM HG: CPT | Performed by: INTERNAL MEDICINE

## 2024-08-07 PROCEDURE — 3077F SYST BP >= 140 MM HG: CPT | Performed by: INTERNAL MEDICINE

## 2024-08-07 PROCEDURE — G0439 PPPS, SUBSEQ VISIT: HCPCS | Performed by: INTERNAL MEDICINE

## 2024-08-07 PROCEDURE — 1036F TOBACCO NON-USER: CPT | Performed by: INTERNAL MEDICINE

## 2024-08-07 PROCEDURE — 1124F ACP DISCUSS-NO DSCNMKR DOCD: CPT | Performed by: INTERNAL MEDICINE

## 2024-08-07 PROCEDURE — 1157F ADVNC CARE PLAN IN RCRD: CPT | Performed by: INTERNAL MEDICINE

## 2024-08-07 PROCEDURE — G0446 INTENS BEHAVE THER CARDIO DX: HCPCS | Performed by: INTERNAL MEDICINE

## 2024-08-07 PROCEDURE — G0442 ANNUAL ALCOHOL SCREEN 15 MIN: HCPCS | Performed by: INTERNAL MEDICINE

## 2024-08-07 PROCEDURE — 99397 PER PM REEVAL EST PAT 65+ YR: CPT | Performed by: INTERNAL MEDICINE

## 2024-08-07 PROCEDURE — 1170F FXNL STATUS ASSESSED: CPT | Performed by: INTERNAL MEDICINE

## 2024-08-07 PROCEDURE — 99213 OFFICE O/P EST LOW 20 MIN: CPT | Performed by: INTERNAL MEDICINE

## 2024-08-07 RX ORDER — BUTALB/ACETAMINOPHEN/CAFFEINE 50-325-40
1 TABLET ORAL DAILY
COMMUNITY

## 2024-08-07 ASSESSMENT — ACTIVITIES OF DAILY LIVING (ADL)
DRESSING: INDEPENDENT
DOING_HOUSEWORK: INDEPENDENT
MANAGING_FINANCES: INDEPENDENT
GROCERY_SHOPPING: INDEPENDENT
TAKING_MEDICATION: INDEPENDENT
BATHING: INDEPENDENT

## 2024-08-07 ASSESSMENT — ENCOUNTER SYMPTOMS
PALPITATIONS: 0
CONSTIPATION: 0
DIARRHEA: 0
OCCASIONAL FEELINGS OF UNSTEADINESS: 0
COUGH: 0
DIZZINESS: 0
DIFFICULTY URINATING: 0
ABDOMINAL PAIN: 0
FREQUENCY: 0
SHORTNESS OF BREATH: 0
HEADACHES: 0

## 2024-08-07 ASSESSMENT — LIFESTYLE VARIABLES
SKIP TO QUESTIONS 9-10: 1
HOW OFTEN DO YOU HAVE A DRINK CONTAINING ALCOHOL: 2-3 TIMES A WEEK
HOW OFTEN DO YOU HAVE SIX OR MORE DRINKS ON ONE OCCASION: NEVER
AUDIT-C TOTAL SCORE: 3
HOW MANY STANDARD DRINKS CONTAINING ALCOHOL DO YOU HAVE ON A TYPICAL DAY: 1 OR 2

## 2024-08-07 ASSESSMENT — PAIN SCALES - GENERAL: PAINLEVEL: 0-NO PAIN

## 2024-08-07 NOTE — PROGRESS NOTES
Subjective   Reason for Visit: Susy Sterling is an 79 y.o. female here for a Medicare Wellness visit.          Reviewed all medications by prescribing practitioner or clinical pharmacist (such as prescriptions, OTCs, herbal therapies and supplements) and documented in the medical record.    HPI    She had cataract surgery done in June    She is following with Dr Evangelista for her hemochromatosis  She will see him in January 2025; prior to that she will donate blood 3 x     Reviewed her labs; her blood glucose was mildly elevated at 105.  And her LDL cholesterol was mildly elevated at 102.  Her TSH was good at 0.96.    Depression screening was completed today using PHQ-2 and was negative.  Falls risk was assessed today. Pt does not use ambulatory aids.   Home safety measures were addressed today.   Functional status was addressed and no concerns per patient today.  Nutritional status completed today.  She tries to eat plenty of fruits and veg.    She does water aerobics at least 3 days a week.   Alcohol screening was performed today taking 5 min of time. She will have wine or beer 2 to 3 times a week, one with dinner.   Pt is independent with finances, medications and transportation.   Pt lives in own home with her .     Seh is taking 1500 mg Vit C a week in a liposomal capsule     The 10-year ASCVD risk score (Eduard DOYLE, et al., 2019) is: 29.3%    Values used to calculate the score:      Age: 79 years      Sex: Female      Is Non- : No      Diabetic: No      Tobacco smoker: No      Systolic Blood Pressure: 140 mmHg      Is BP treated: Yes      HDL Cholesterol: 43.8 mg/dL      Total Cholesterol: 183 mg/dL   For about 15 minutes, cardiovascular risks was discussed . This patient's  10 year CV risk is   29.3  %. If needed, lifestyle modifications recommended, including nutritional choices, exercise, and elimination of habits contributing to risk.  We agreed on a plan to reduce the  "current cardiovascular risk based on the above discussion as needed.  She is taking losartan 25 mg . She monitors her blood pressure at home and SBP are usually 120's to low 130's.   She is not a candidate for aspirin for primary prevention due to her age.     Advanced Care Planning ( including a Living will, Healthcare POA, as well as specific end of life choices or directives) , was discussed with the patient . She admits neither she nor her  have a living will or HCPOA.  She is encouraged to complete these documents.     She no longer wants to have mammograms .   Patient Care Team:  Gabriela Colón DO as PCP - General (Internal Medicine)  Nevaeh Jones MD as PCP - [employer] PCP  Joseph Evangelista MD as Consulting Physician (Hematology and Oncology)     Review of Systems   Respiratory:  Negative for cough and shortness of breath.    Cardiovascular:  Negative for chest pain, palpitations and leg swelling.   Gastrointestinal:  Negative for abdominal pain, constipation and diarrhea.   Genitourinary:  Negative for difficulty urinating, enuresis and frequency.   Neurological:  Negative for dizziness and headaches.       Objective   Vitals:  BP (!) 180/98   Pulse 86   Resp 16   Ht 1.626 m (5' 4\")   Wt 68 kg (150 lb)   BMI 25.75 kg/m²       Physical Exam  Constitutional:       Appearance: Normal appearance.   HENT:      Mouth/Throat:      Mouth: Mucous membranes are moist.      Pharynx: Oropharynx is clear.   Eyes:      Conjunctiva/sclera: Conjunctivae normal.      Pupils: Pupils are equal, round, and reactive to light.   Cardiovascular:      Rate and Rhythm: Normal rate and regular rhythm.      Heart sounds: Normal heart sounds.   Pulmonary:      Effort: Pulmonary effort is normal.      Breath sounds: Normal breath sounds.   Abdominal:      General: Bowel sounds are normal. There is no distension.      Palpations: Abdomen is soft. There is no mass.      Tenderness: There is no abdominal " tenderness.   Lymphadenopathy:      Cervical: No cervical adenopathy.   Skin:     General: Skin is warm and dry.   Neurological:      General: No focal deficit present.       Assessment/Plan   Problem List Items Addressed This Visit    None  #1 encounter for preventive healthcare exam; Medicare wellness visit: Wellness visit was completed today.  She is encouraged to obtain documents for living will and healthcare power of .  She agrees to have a repeat DEXA scan as her last DEXA showed a T-score of -1.6.  In addition to Medicare Wellness visit, the following issues were addressed with separate E/M and treatment decision such as refills or  tests were ordered:   #2 benign hypertension: She remains on losartan 25 mg daily.  We did discuss that her blood pressure varies and she should continue to check it periodically at home.  2.  Hypothyroidism: Her TSH was 0.96.  She remains on 100 mcg of levothyroxine.  4.  Hemochromatosis: She is following with hematology and has been cleared to do blood donations for her phlebotomy to keep her iron levels under control.  She should see me again in 6 months and have recheck blood test for CMP, CBC, lipid, TSH and CBC with differential.

## 2024-08-07 NOTE — PATIENT INSTRUCTIONS
I entered orders for Bone Density scan ( DEXA) and also repeat blood tests.    Get blood test done closer to next visit with me.     See me again in 6 mo for 30 min visit.     Please call [place] and let them know you are no longer seeing Dr Nevaeh Jones .

## 2024-10-09 DIAGNOSIS — E03.9 HYPOTHYROIDISM, UNSPECIFIED TYPE: ICD-10-CM

## 2024-10-09 DIAGNOSIS — I10 ESSENTIAL (PRIMARY) HYPERTENSION: ICD-10-CM

## 2024-10-09 RX ORDER — LOSARTAN POTASSIUM 25 MG/1
25 TABLET ORAL DAILY
Qty: 90 TABLET | Refills: 1 | Status: SHIPPED | OUTPATIENT
Start: 2024-10-09

## 2024-10-09 RX ORDER — LEVOTHYROXINE SODIUM 100 UG/1
100 TABLET ORAL DAILY
Qty: 90 TABLET | Refills: 1 | Status: SHIPPED | OUTPATIENT
Start: 2024-10-09

## 2024-12-02 NOTE — ASSESSMENT & PLAN NOTE
Addressed in a different encounter, patient has already been called by another team mate.   Blood pressure excellent control  Continue losartan 25 mg daily, DASH diet, and exercise

## 2025-01-13 ENCOUNTER — LAB (OUTPATIENT)
Dept: LAB | Facility: LAB | Age: 80
End: 2025-01-13
Payer: MEDICARE

## 2025-01-13 DIAGNOSIS — E83.119 HEMOCHROMATOSIS, UNSPECIFIED HEMOCHROMATOSIS TYPE: ICD-10-CM

## 2025-01-13 PROCEDURE — 82728 ASSAY OF FERRITIN: CPT

## 2025-01-13 PROCEDURE — 85025 COMPLETE CBC W/AUTO DIFF WBC: CPT

## 2025-01-13 PROCEDURE — 83550 IRON BINDING TEST: CPT

## 2025-01-13 PROCEDURE — 83540 ASSAY OF IRON: CPT

## 2025-01-14 LAB
BASOPHILS # BLD AUTO: 0.07 X10*3/UL (ref 0–0.1)
BASOPHILS NFR BLD AUTO: 0.8 %
EOSINOPHIL # BLD AUTO: 0.16 X10*3/UL (ref 0–0.4)
EOSINOPHIL NFR BLD AUTO: 1.8 %
ERYTHROCYTE [DISTWIDTH] IN BLOOD BY AUTOMATED COUNT: 12.8 % (ref 11.5–14.5)
FERRITIN SERPL-MCNC: 32 NG/ML (ref 8–150)
HCT VFR BLD AUTO: 37.9 % (ref 36–46)
HGB BLD-MCNC: 12.5 G/DL (ref 12–16)
IMM GRANULOCYTES # BLD AUTO: 0.02 X10*3/UL (ref 0–0.5)
IMM GRANULOCYTES NFR BLD AUTO: 0.2 % (ref 0–0.9)
IRON SATN MFR SERPL: 13 % (ref 25–45)
IRON SERPL-MCNC: 43 UG/DL (ref 35–150)
LYMPHOCYTES # BLD AUTO: 2.74 X10*3/UL (ref 0.8–3)
LYMPHOCYTES NFR BLD AUTO: 31.4 %
MCH RBC QN AUTO: 30.6 PG (ref 26–34)
MCHC RBC AUTO-ENTMCNC: 33 G/DL (ref 32–36)
MCV RBC AUTO: 93 FL (ref 80–100)
MONOCYTES # BLD AUTO: 0.89 X10*3/UL (ref 0.05–0.8)
MONOCYTES NFR BLD AUTO: 10.2 %
NEUTROPHILS # BLD AUTO: 4.84 X10*3/UL (ref 1.6–5.5)
NEUTROPHILS NFR BLD AUTO: 55.6 %
NRBC BLD-RTO: 0 /100 WBCS (ref 0–0)
PLATELET # BLD AUTO: 297 X10*3/UL (ref 150–450)
RBC # BLD AUTO: 4.09 X10*6/UL (ref 4–5.2)
TIBC SERPL-MCNC: 319 UG/DL (ref 240–445)
UIBC SERPL-MCNC: 276 UG/DL (ref 110–370)
WBC # BLD AUTO: 8.7 X10*3/UL (ref 4.4–11.3)

## 2025-01-17 ENCOUNTER — OFFICE VISIT (OUTPATIENT)
Dept: HEMATOLOGY/ONCOLOGY | Facility: CLINIC | Age: 80
End: 2025-01-17
Payer: MEDICARE

## 2025-01-17 VITALS
TEMPERATURE: 97 F | WEIGHT: 152.23 LBS | RESPIRATION RATE: 16 BRPM | SYSTOLIC BLOOD PRESSURE: 181 MMHG | OXYGEN SATURATION: 96 % | HEART RATE: 79 BPM | DIASTOLIC BLOOD PRESSURE: 94 MMHG | BODY MASS INDEX: 26.13 KG/M2

## 2025-01-17 DIAGNOSIS — E83.119 HEMOCHROMATOSIS, UNSPECIFIED HEMOCHROMATOSIS TYPE: ICD-10-CM

## 2025-01-17 PROCEDURE — 3077F SYST BP >= 140 MM HG: CPT | Performed by: INTERNAL MEDICINE

## 2025-01-17 PROCEDURE — 1126F AMNT PAIN NOTED NONE PRSNT: CPT | Performed by: INTERNAL MEDICINE

## 2025-01-17 PROCEDURE — G2211 COMPLEX E/M VISIT ADD ON: HCPCS | Performed by: INTERNAL MEDICINE

## 2025-01-17 PROCEDURE — 1159F MED LIST DOCD IN RCRD: CPT | Performed by: INTERNAL MEDICINE

## 2025-01-17 PROCEDURE — 99213 OFFICE O/P EST LOW 20 MIN: CPT | Performed by: INTERNAL MEDICINE

## 2025-01-17 PROCEDURE — 1157F ADVNC CARE PLAN IN RCRD: CPT | Performed by: INTERNAL MEDICINE

## 2025-01-17 PROCEDURE — 3080F DIAST BP >= 90 MM HG: CPT | Performed by: INTERNAL MEDICINE

## 2025-01-17 ASSESSMENT — PAIN SCALES - GENERAL: PAINLEVEL_OUTOF10: 0-NO PAIN

## 2025-01-17 NOTE — PROGRESS NOTES
BP re-checked by RN manually 130/84.  No phlebotomy / blood donation at this time.  Follow-up with fasting labs prior in 6 months.  Call back instructions reviewed.  Patient verbalized understanding.

## 2025-01-17 NOTE — PROGRESS NOTES
Patient ID: Susy Sterling is a 79 y.o. female.  Referring Physician: Joseph Evangelista MD  [address] [facility] [address]  [place] [address]  Primary Care Provider: Gabriela Colón DO    ORDERS & PATIENT INSTRUCTIONS:      No blood donation at this time    Recheck it in 6-month    CBC, fasting iron group and ferritin in 6 months         ASSESSMENT, PROBLEM LIST, DECISION MAKING, PLAN.    History of hereditary hemochromatosis, diagnosed sometime in 2005 further details unclear.  Patient has been on periodic phlebotomies  according to the patient she was initially diagnosed by Dr. Vega/primary care physician who was in [place] in private practice, she thinks that the blood work was done at [facility], further detail is unclear, she was later sent to Dr. Yap at [facility] who started her on phlebotomy at that time     Past medical history  Hypertension, hypothyroidism, osteoarthritis      INTERVAL HISTORY :     Patient returns today for follow-up on history of hereditary hemochromatosis,   Patient has been donating her blood via blood bank and has donated 3 times in last 6 to 7 months    Patient denies history of diabetes, denies any change in the color of the skin, denies any inflammatory arthritis although does have osteoarthritis and denies any jaundice.  Patient is from [place] ancestry      PHYSICAL EXAMINATION:    General: Conscious, alert, oriented x3, not in acute distress.  HEENT:    No icterus.    Chest:Bilateral symmetrical,     CVS:  S1, S2.    Abdomen:  Soft, no palpable mass   Extremities: No clubbing, cyanosis,     Skin: No petechial rash.        ASSESSMENT & PLAN:       Patient with history of hereditary hemochromatosis, further details unclear initially diagnosed in 2005 by primary care physician Dr. Vega, patient  We were unable to obtain results of her genetic testing  Has been donating blood via blood bank and has donated 3 times in  last 6-month  Her hemoglobin is at the lower end of normal and at this time I have advised her to hold off to any blood donation especially she is becoming iron deficient  We will continue watchful waiting  Recheck it in 6 months, our goal is to keep her ferritin around 50 and hemoglobin in normal range  Advised to avoid excessive iron containing foods      Recheck it in 6-month        VITALS:   1.77 meters squared BP (!) 181/94   Pulse 79   Temp 36.1 °C (97 °F)   Resp 16   Wt 69 kg (152 lb 3.6 oz)   SpO2 96%   BMI 26.13 kg/m²     LABS:    CBC:  Recent Labs     01/13/25  1445 06/17/24  1148 03/07/24  0831 12/04/23  1425 09/27/23  1424   WBC 8.7 10.1 7.0 8.6 8.8   HGB 12.5 13.8 13.9 12.5 14.0   HCT 37.9 40.2 41.7 38.3 41.6    242 268 306 302   MCV 93 95 89 97 100       CMP:  Recent Labs     08/05/24  0724 03/07/24  0831 12/04/23  1425 08/31/23  1216 02/23/23  1145 04/23/19  1243 10/15/18  1224 07/26/18  1151 01/25/18  1152    141 139 141 140   < > 143   < > 140   K 4.4 4.3 4.3 4.3 4.2   < > 3.8   < > 4.1   * 107 104 106 106   < > 109*   < > 107   CO2 27 27 25 26 24   < > 25   < > 25   ANIONGAP 11 11 14 13 14   < > 13   < > 12   BUN 19 23 18 22 15   < > 18   < > 12   CREATININE 0.86 0.87 0.77 0.76 0.76   < > 0.80   < > 0.75   EGFR 70 69 80  --   --   --   --   --   --    MG  --   --   --   --  2.08  --  2.11  --  2.15    < > = values in this interval not displayed.     Recent Labs     08/05/24  0724 03/07/24  0831 12/04/23  1425 08/31/23  1216 02/23/23  1145   ALBUMIN 3.9 4.2 4.3 4.4 4.3   ALKPHOS 69 80 78 80 72   ALT 12 12 14 12 15   AST 15 14 15 14 18   BILITOT 0.5 0.5 0.3 0.6 0.3       HEME/ENDO:  Recent Labs     01/13/25  1445 08/05/24  0724 06/17/24  1148 03/07/24  0831 12/04/23  1425 09/27/23  1424 08/31/23  1216 04/28/23  1522 02/23/23  1145 11/15/22  1530 08/18/22  1139 07/22/22  1527 05/26/22  1222   FERRITIN 32  --  132 44 40   < >  --    < > 43   < >  --    < >  --    IRONSAT 13*   "--  36 42 14*   < >  --    < > 13*   < >  --    < >  --    TSH  --  0.96  --   --   --   --  1.18  --  0.99  --  0.74  --  0.63   [id number]  --   --   --   --   --   --   --   --   --   --  896  --  >2000*    < > = values in this interval not displayed.        MICRO: No results for input(s): \"ESR\", \"CRP\", \"PROCAL\" in the last 99074 hours.  No results found for the last 90 days.        TUMOR MARKERS:  No results found for: \"LABCA2\", \"CEA\", \"\", \"PSA\", \"AFPTM\", \"HCGTM\", \"\"             IMAGING:         DIGITAL MAMM SCREENING  MRN: [id number]  Patient Name: KVNG JARAMILLO     STUDY:  DIGITAL MAMM SCREENING W/ [facility];  1/26/2023 1:21 pm     ACCESSION NUMBER(S):  [id number]     ORDERING CLINICIAN:  TUAN UMANZOR     INDICATION:  Screening. Family history unknown for breast cancer. History of a  benign left excisional biopsy.     COMPARISON:  12/30/2021, 11/23/2020, 08/03/2017, 03/18/2015     FINDINGS:  2D and tomosynthesis images were reviewed at 1 mm slice thickness.     The breast tissue is almost entirely fatty.   No suspicious masses or  calcifications are identified.     IMPRESSION:  No mammographic evidence of malignancy.     BI-RADS CATEGORY:     Category: 1 - Negative.  Recommendation: 1 Year Screening.     For any future breast imaging appointments, please call [phone]  ([phone]).     Patient letter sent SNORM             Current Outpatient Medications   Medication Sig Dispense Refill    calcium citrate-vitamin D3 (Citracal+D) 315 mg-5 mcg (200 unit) tablet Take 1 tablet by mouth once daily.      cholecalciferol (Vitamin D-3) 50 MCG (2000 UT) tablet Take 1 tablet (2,000 Units) by mouth once daily.      cyanocobalamin (Vitamin B-12) 1,000 mcg tablet Take 2 tablets (2,000 mcg) by mouth once daily.      levothyroxine (Synthroid, Levoxyl) 100 mcg tablet TAKE 1 TABLET BY MOUTH ONCE DAILY. 90 tablet 1    losartan (Cozaar) 25 mg tablet TAKE 1 TABLET BY MOUTH EVERY DAY 90 tablet 1    magnesium oxide (Mag-Ox) 400 " mg (241.3 mg magnesium) tablet Take 1 tablet (400 mg) by mouth once daily.      quercetin 500 mg capsule Take 1 capsule by mouth once daily.       No current facility-administered medications for this visit.                  SOCIAL HISTORY:    reports current alcohol use.   reports no history of drug use.,   Tobacco Use: Medium Risk (8/7/2024)    Patient History     Smoking Tobacco Use: Former     Smokeless Tobacco Use: Never     Passive Exposure: Not on file       FAMILY HISTORY:  Family History   Problem Relation Name Age of Onset    Other (completed suicide) Father      Other (homicide) Brother      Alzheimer's disease Mother's Sister      Other (parkinsons) Maternal Grandmother         ALLERGY:   Patient has no known allergies.           she quit smoking about 52 years ago. Her smoking use included cigarettes. She has never used smokeless tobacco.  She  reports current alcohol use.  She  reports no history of drug use.      Medication reviewed in e-chart  Patient is monitored for medication toxicity  labs reviewed and interpreted independently, X rays independently reviewed  Notes from other physicians involved in care were reviewed    Charting was completed using voice recognition technology and may include unintended errors.    CLAUDIA GODOY MD, YENNY.    Annmarie Dumont Master Clinician in Hematology and Oncology  Medical Director, [facility] at [facility].  [place] office  Phone [phone]  Fax      [phone]  [facility].  Phone [phone]  Fax     [phone]

## 2025-01-17 NOTE — PATIENT INSTRUCTIONS
ORDERS & PATIENT INSTRUCTIONS:        No blood donation at this time     Recheck it in 6-month     CBC, fasting iron group and ferritin in 6 months

## 2025-01-29 ENCOUNTER — APPOINTMENT (OUTPATIENT)
Dept: RADIOLOGY | Facility: CLINIC | Age: 80
End: 2025-01-29
Payer: MEDICARE

## 2025-02-01 LAB
ALBUMIN SERPL-MCNC: 4.1 G/DL (ref 3.6–5.1)
ALP SERPL-CCNC: 70 U/L (ref 37–153)
ALT SERPL-CCNC: 14 U/L (ref 6–29)
ANION GAP SERPL CALCULATED.4IONS-SCNC: 6 MMOL/L (CALC) (ref 7–17)
AST SERPL-CCNC: 16 U/L (ref 10–35)
BASOPHILS # BLD AUTO: 51 CELLS/UL (ref 0–200)
BASOPHILS NFR BLD AUTO: 0.8 %
BILIRUB SERPL-MCNC: 0.5 MG/DL (ref 0.2–1.2)
BUN SERPL-MCNC: 21 MG/DL (ref 7–25)
CALCIUM SERPL-MCNC: 9.5 MG/DL (ref 8.6–10.4)
CHLORIDE SERPL-SCNC: 108 MMOL/L (ref 98–110)
CHOLEST SERPL-MCNC: 183 MG/DL
CHOLEST/HDLC SERPL: 3.4 (CALC)
CO2 SERPL-SCNC: 26 MMOL/L (ref 20–32)
CREAT SERPL-MCNC: 0.84 MG/DL (ref 0.6–1)
EGFRCR SERPLBLD CKD-EPI 2021: 72 ML/MIN/1.73M2
EOSINOPHIL # BLD AUTO: 154 CELLS/UL (ref 15–500)
EOSINOPHIL NFR BLD AUTO: 2.4 %
ERYTHROCYTE [DISTWIDTH] IN BLOOD BY AUTOMATED COUNT: 12.5 % (ref 11–15)
EST. AVERAGE GLUCOSE BLD GHB EST-MCNC: 111 MG/DL
EST. AVERAGE GLUCOSE BLD GHB EST-SCNC: 6.2 MMOL/L
GLUCOSE SERPL-MCNC: 94 MG/DL (ref 65–99)
HBA1C MFR BLD: 5.5 % OF TOTAL HGB
HCT VFR BLD AUTO: 42.5 % (ref 35–45)
HDLC SERPL-MCNC: 54 MG/DL
HGB BLD-MCNC: 13.8 G/DL (ref 11.7–15.5)
LDLC SERPL CALC-MCNC: 108 MG/DL (CALC)
LYMPHOCYTES # BLD AUTO: 1958 CELLS/UL (ref 850–3900)
LYMPHOCYTES NFR BLD AUTO: 30.6 %
MCH RBC QN AUTO: 30.3 PG (ref 27–33)
MCHC RBC AUTO-ENTMCNC: 32.5 G/DL (ref 32–36)
MCV RBC AUTO: 93.2 FL (ref 80–100)
MONOCYTES # BLD AUTO: 691 CELLS/UL (ref 200–950)
MONOCYTES NFR BLD AUTO: 10.8 %
NEUTROPHILS # BLD AUTO: 3546 CELLS/UL (ref 1500–7800)
NEUTROPHILS NFR BLD AUTO: 55.4 %
NONHDLC SERPL-MCNC: 129 MG/DL (CALC)
PLATELET # BLD AUTO: 302 THOUSAND/UL (ref 140–400)
PMV BLD REES-ECKER: 10.8 FL (ref 7.5–12.5)
POTASSIUM SERPL-SCNC: 4.4 MMOL/L (ref 3.5–5.3)
PROT SERPL-MCNC: 6.8 G/DL (ref 6.1–8.1)
RBC # BLD AUTO: 4.56 MILLION/UL (ref 3.8–5.1)
SODIUM SERPL-SCNC: 140 MMOL/L (ref 135–146)
TRIGL SERPL-MCNC: 111 MG/DL
TSH SERPL-ACNC: 0.91 MIU/L (ref 0.4–4.5)
WBC # BLD AUTO: 6.4 THOUSAND/UL (ref 3.8–10.8)

## 2025-02-04 ENCOUNTER — HOSPITAL ENCOUNTER (OUTPATIENT)
Dept: RADIOLOGY | Facility: CLINIC | Age: 80
Discharge: HOME | End: 2025-02-04
Payer: MEDICARE

## 2025-02-04 DIAGNOSIS — Z78.0 POST-MENOPAUSAL: ICD-10-CM

## 2025-02-04 PROCEDURE — 77080 DXA BONE DENSITY AXIAL: CPT

## 2025-02-04 PROCEDURE — 77080 DXA BONE DENSITY AXIAL: CPT | Performed by: STUDENT IN AN ORGANIZED HEALTH CARE EDUCATION/TRAINING PROGRAM

## 2025-02-07 ENCOUNTER — APPOINTMENT (OUTPATIENT)
Dept: PRIMARY CARE | Facility: CLINIC | Age: 80
End: 2025-02-07
Payer: MEDICARE

## 2025-02-26 NOTE — PROGRESS NOTES
"Subjective   Patient ID: Susy Sterling is a 79 y.o. female who presents for follow up.    HPI     She is doing well today.  Does water aerobics 3-4 x weekly  Following a clean diet.  Had been taking herbal remedies at home to try and help reduce BP.    Has not been checking BP at home  States her last BP at another doctor visit was 130/68    We reviewed labs today  Blood sugar 94  Total cholesterol 183    HDL 54  Triglycerides 111  TSH 0.91  Liver and kidney function -good    Dexa- osteopenia    Review of Systems   Respiratory:  Negative for shortness of breath.    Cardiovascular:  Negative for chest pain and palpitations.         Current Outpatient Medications:     calcium citrate-vitamin D3 (Citracal+D) 315 mg-5 mcg (200 unit) tablet, Take 1 tablet by mouth once daily., Disp: , Rfl:     cholecalciferol (Vitamin D-3) 50 MCG (2000 UT) tablet, Take 1 tablet (2,000 Units) by mouth once daily., Disp: , Rfl:     cyanocobalamin (Vitamin B-12) 1,000 mcg tablet, Take 2 tablets (2,000 mcg) by mouth once daily., Disp: , Rfl:     levothyroxine (Synthroid, Levoxyl) 100 mcg tablet, TAKE 1 TABLET BY MOUTH ONCE DAILY., Disp: 90 tablet, Rfl: 1    losartan (Cozaar) 25 mg tablet, TAKE 1 TABLET BY MOUTH EVERY DAY, Disp: 90 tablet, Rfl: 1    magnesium oxide (Mag-Ox) 400 mg (241.3 mg magnesium) tablet, Take 1 tablet (400 mg) by mouth once daily., Disp: , Rfl:     quercetin 500 mg capsule, Take 1 capsule by mouth once daily., Disp: , Rfl:     Objective   BP (!) 140/98   Pulse 76   Resp 16   Ht 1.626 m (5' 4\")   Wt 67.7 kg (149 lb 3.2 oz)   BMI 25.61 kg/m²     Physical Exam  Constitutional:       Appearance: Normal appearance.   Eyes:      Conjunctiva/sclera: Conjunctivae normal.      Pupils: Pupils are equal, round, and reactive to light.   Cardiovascular:      Rate and Rhythm: Normal rate and regular rhythm.      Heart sounds: Normal heart sounds.   Pulmonary:      Effort: Pulmonary effort is normal.      Breath sounds: " Normal breath sounds.   Lymphadenopathy:      Cervical: No cervical adenopathy.         Assessment/Plan   Problem List Items Addressed This Visit          Cardiac and Vasculature    Benign essential hypertension - Primary     BP today was elevated to 176/88 on intake.  Recheck was still elevated at 140/98  I recommended increasing Losartan but she would like to try to improve it with herbal remedies at home. She maintains that her BP has been lower for past dr visit.For now I told her to resume checking her BP at home 3x a week and bring them with her to next visit. We will revisit this then. Discussed that age is a primary  of BP and can increase risk for MI/stroke .  Remain on Losartan 25 mg daily for now.            Endocrine/Metabolic    Hypothyroidism     Clinically euthyroid   Remain on Levothyroxine 100 mcg daily             Musculoskeletal and Injuries    Osteopenia     DEXA this month confirmed osteopenia  No medication at this point.  Taking Citracal 2 per ramana/  Recommended calcium 4383-6437 mg a day.            Please return to see me in 3 months for a 30 minute follow up.     Scribe Attestation  By signing my name below, IIsidra Scribe   attest that this documentation has been prepared under the direction and in the presence of Gabriela Colón DO.

## 2025-02-26 NOTE — ASSESSMENT & PLAN NOTE
BP today was elevated to 176/88 on intake.  Recheck was still elevated at 140/98  I recommended increasing Losartan but she would like to try to improve it with herbal remedies at home. For now I told her to resume checking her BP at home 3x a week and bring them with her to next visit. We will revisit this then.  Remain on Losartan 25 mg daily for now.

## 2025-02-28 ENCOUNTER — APPOINTMENT (OUTPATIENT)
Dept: PRIMARY CARE | Facility: CLINIC | Age: 80
End: 2025-02-28
Payer: MEDICARE

## 2025-02-28 VITALS
BODY MASS INDEX: 25.47 KG/M2 | SYSTOLIC BLOOD PRESSURE: 140 MMHG | RESPIRATION RATE: 16 BRPM | DIASTOLIC BLOOD PRESSURE: 98 MMHG | HEIGHT: 64 IN | HEART RATE: 76 BPM | WEIGHT: 149.2 LBS

## 2025-02-28 DIAGNOSIS — E06.3 HYPOTHYROIDISM DUE TO HASHIMOTO THYROIDITIS: ICD-10-CM

## 2025-02-28 DIAGNOSIS — M85.80 OSTEOPENIA, UNSPECIFIED LOCATION: ICD-10-CM

## 2025-02-28 DIAGNOSIS — I10 BENIGN ESSENTIAL HYPERTENSION: Primary | ICD-10-CM

## 2025-02-28 PROCEDURE — 1158F ADVNC CARE PLAN TLK DOCD: CPT | Performed by: INTERNAL MEDICINE

## 2025-02-28 PROCEDURE — 1157F ADVNC CARE PLAN IN RCRD: CPT | Performed by: INTERNAL MEDICINE

## 2025-02-28 PROCEDURE — 99214 OFFICE O/P EST MOD 30 MIN: CPT | Performed by: INTERNAL MEDICINE

## 2025-02-28 PROCEDURE — 3077F SYST BP >= 140 MM HG: CPT | Performed by: INTERNAL MEDICINE

## 2025-02-28 PROCEDURE — 1126F AMNT PAIN NOTED NONE PRSNT: CPT | Performed by: INTERNAL MEDICINE

## 2025-02-28 PROCEDURE — 1159F MED LIST DOCD IN RCRD: CPT | Performed by: INTERNAL MEDICINE

## 2025-02-28 PROCEDURE — 1036F TOBACCO NON-USER: CPT | Performed by: INTERNAL MEDICINE

## 2025-02-28 PROCEDURE — 1123F ACP DISCUSS/DSCN MKR DOCD: CPT | Performed by: INTERNAL MEDICINE

## 2025-02-28 PROCEDURE — 3079F DIAST BP 80-89 MM HG: CPT | Performed by: INTERNAL MEDICINE

## 2025-02-28 ASSESSMENT — PATIENT HEALTH QUESTIONNAIRE - PHQ9
1. LITTLE INTEREST OR PLEASURE IN DOING THINGS: NOT AT ALL
2. FEELING DOWN, DEPRESSED OR HOPELESS: NOT AT ALL
SUM OF ALL RESPONSES TO PHQ9 QUESTIONS 1 AND 2: 0

## 2025-02-28 ASSESSMENT — ENCOUNTER SYMPTOMS
PALPITATIONS: 0
SHORTNESS OF BREATH: 0

## 2025-02-28 ASSESSMENT — PAIN SCALES - GENERAL: PAINLEVEL_OUTOF10: 0-NO PAIN

## 2025-02-28 NOTE — ASSESSMENT & PLAN NOTE
DEXA this month confirmed.  No medication at this point.  Taking Citracal 2 days.  Recommended calcium 6852-2007 mg a day.

## 2025-02-28 NOTE — PATIENT INSTRUCTIONS
Recommended calcium 8071-0718 mg a day.    Check your BP at home two or three times a week.    Can check at different times of day ( sometimes morning and sometimes afternoon or evening)  Write them down and bring to next visit.     Return to see me in 3 mo for 30 min visit to discuss blood pressures.

## 2025-04-02 DIAGNOSIS — I10 ESSENTIAL (PRIMARY) HYPERTENSION: ICD-10-CM

## 2025-04-02 DIAGNOSIS — E03.9 HYPOTHYROIDISM, UNSPECIFIED TYPE: ICD-10-CM

## 2025-04-03 RX ORDER — LOSARTAN POTASSIUM 25 MG/1
25 TABLET ORAL DAILY
Qty: 90 TABLET | Refills: 1 | Status: SHIPPED | OUTPATIENT
Start: 2025-04-03

## 2025-04-03 RX ORDER — LEVOTHYROXINE SODIUM 100 UG/1
100 TABLET ORAL DAILY
Qty: 90 TABLET | Refills: 1 | Status: SHIPPED | OUTPATIENT
Start: 2025-04-03

## 2025-06-06 ENCOUNTER — APPOINTMENT (OUTPATIENT)
Dept: PRIMARY CARE | Facility: CLINIC | Age: 80
End: 2025-06-06
Payer: MEDICARE

## 2025-07-16 ENCOUNTER — LAB (OUTPATIENT)
Dept: LAB | Facility: HOSPITAL | Age: 80
End: 2025-07-16
Payer: MEDICARE

## 2025-07-16 DIAGNOSIS — E83.119 HEMOCHROMATOSIS, UNSPECIFIED: Primary | ICD-10-CM

## 2025-07-16 DIAGNOSIS — E83.119 HEMOCHROMATOSIS, UNSPECIFIED HEMOCHROMATOSIS TYPE: ICD-10-CM

## 2025-07-16 LAB
BASOPHILS # BLD AUTO: 0.06 X10*3/UL (ref 0–0.1)
BASOPHILS NFR BLD AUTO: 1 %
EOSINOPHIL # BLD AUTO: 0.2 X10*3/UL (ref 0–0.4)
EOSINOPHIL NFR BLD AUTO: 3.4 %
ERYTHROCYTE [DISTWIDTH] IN BLOOD BY AUTOMATED COUNT: 13.1 % (ref 11.5–14.5)
FERRITIN SERPL-MCNC: 82 NG/ML (ref 8–150)
HCT VFR BLD AUTO: 43.5 % (ref 36–46)
HGB BLD-MCNC: 14.6 G/DL (ref 12–16)
IMM GRANULOCYTES # BLD AUTO: 0.02 X10*3/UL (ref 0–0.5)
IMM GRANULOCYTES NFR BLD AUTO: 0.3 % (ref 0–0.9)
IRON SATN MFR SERPL: 74 % (ref 25–45)
IRON SERPL-MCNC: 205 UG/DL (ref 35–150)
LYMPHOCYTES # BLD AUTO: 2.11 X10*3/UL (ref 0.8–3)
LYMPHOCYTES NFR BLD AUTO: 36.3 %
MCH RBC QN AUTO: 32.2 PG (ref 26–34)
MCHC RBC AUTO-ENTMCNC: 33.6 G/DL (ref 32–36)
MCV RBC AUTO: 96 FL (ref 80–100)
MONOCYTES # BLD AUTO: 0.72 X10*3/UL (ref 0.05–0.8)
MONOCYTES NFR BLD AUTO: 12.4 %
NEUTROPHILS # BLD AUTO: 2.7 X10*3/UL (ref 1.6–5.5)
NEUTROPHILS NFR BLD AUTO: 46.6 %
NRBC BLD-RTO: 0 /100 WBCS (ref 0–0)
PLATELET # BLD AUTO: 262 X10*3/UL (ref 150–450)
RBC # BLD AUTO: 4.54 X10*6/UL (ref 4–5.2)
TIBC SERPL-MCNC: 276 UG/DL (ref 240–445)
UIBC SERPL-MCNC: 71 UG/DL (ref 110–370)
WBC # BLD AUTO: 5.8 X10*3/UL (ref 4.4–11.3)

## 2025-07-16 PROCEDURE — 83540 ASSAY OF IRON: CPT

## 2025-07-16 PROCEDURE — 83550 IRON BINDING TEST: CPT

## 2025-07-16 PROCEDURE — 82728 ASSAY OF FERRITIN: CPT

## 2025-07-16 PROCEDURE — 36415 COLL VENOUS BLD VENIPUNCTURE: CPT

## 2025-07-16 PROCEDURE — 85025 COMPLETE CBC W/AUTO DIFF WBC: CPT

## 2025-07-18 ENCOUNTER — OFFICE VISIT (OUTPATIENT)
Dept: HEMATOLOGY/ONCOLOGY | Facility: CLINIC | Age: 80
End: 2025-07-18
Payer: MEDICARE

## 2025-07-18 VITALS
TEMPERATURE: 97.5 F | HEART RATE: 70 BPM | BODY MASS INDEX: 25.6 KG/M2 | WEIGHT: 149.14 LBS | SYSTOLIC BLOOD PRESSURE: 158 MMHG | DIASTOLIC BLOOD PRESSURE: 82 MMHG | RESPIRATION RATE: 16 BRPM | OXYGEN SATURATION: 96 %

## 2025-07-18 DIAGNOSIS — E83.119 HEMOCHROMATOSIS, UNSPECIFIED HEMOCHROMATOSIS TYPE: ICD-10-CM

## 2025-07-18 PROCEDURE — 1159F MED LIST DOCD IN RCRD: CPT | Performed by: INTERNAL MEDICINE

## 2025-07-18 PROCEDURE — 3077F SYST BP >= 140 MM HG: CPT | Performed by: INTERNAL MEDICINE

## 2025-07-18 PROCEDURE — 3079F DIAST BP 80-89 MM HG: CPT | Performed by: INTERNAL MEDICINE

## 2025-07-18 PROCEDURE — 99214 OFFICE O/P EST MOD 30 MIN: CPT | Performed by: INTERNAL MEDICINE

## 2025-07-18 PROCEDURE — 1126F AMNT PAIN NOTED NONE PRSNT: CPT | Performed by: INTERNAL MEDICINE

## 2025-07-18 PROCEDURE — G2211 COMPLEX E/M VISIT ADD ON: HCPCS | Performed by: INTERNAL MEDICINE

## 2025-07-18 ASSESSMENT — PAIN SCALES - GENERAL: PAINLEVEL_OUTOF10: 0-NO PAIN

## 2025-07-18 NOTE — PROGRESS NOTES
Patient ID: Susy Sterling is a 80 y.o. female.  Referring Physician: Joseph Evangelista MD  [address] [facility]  [place] [address]  Primary Care Provider: Gabriela Colón DO    ORDERS & PATIENT INSTRUCTIONS:    Patient can have 1 unit of blood donation via blood bank      Recheck it in 6-month    CBC, fasting iron group and ferritin in 6 months         ASSESSMENT, PROBLEM LIST, DECISION MAKING, PLAN.    History of hereditary hemochromatosis, diagnosed sometime in 2005 further details unclear.  Patient has been on periodic phlebotomies  according to the patient she was initially diagnosed by Dr. Vega/primary care physician who was in [place] in private practice, she thinks that the blood work was done at [facility] [place], further detail is unclear, she was later sent to Dr. Yap at [facility] who started her on phlebotomy at that time     Past medical history  Hypertension, hypothyroidism, osteoarthritis      INTERVAL HISTORY :     Patient returns today for follow-up on history of hereditary hemochromatosis,   Patient has been donating blood via blood bank, although her ferritin was only 32 and iron saturation was 13 so she was advised to hold off to any blood donations      Patient denies history of diabetes, denies any change in the color of the skin, denies any inflammatory arthritis although does have osteoarthritis and denies any jaundice.  Patient is from [place] ancestry      PHYSICAL EXAMINATION:    General: Conscious, alert, oriented x3, not in acute distress.  HEENT:    No icterus.    Chest:Bilateral symmetrical,     CVS:  S1, S2.    Abdomen:  Soft, no palpable mass   Extremities: No clubbing, cyanosis,     Skin: No petechial rash.        ASSESSMENT & PLAN:       Patient with history of hereditary hemochromatosis, further details unclear initially diagnosed in 2005 by primary care physician Dr. Vega, patient  We were unable to obtain results  of her genetic testing    Patient will do 1 unit of blood donation via blood bank  Return for follow-up in 6 months  Our goal is to keep ferritin around 50   Advised to avoid excessive iron containing foods      Recheck it in 6-month        VITALS:   1.75 meters squared /82   Pulse 70   Temp 36.4 °C (97.5 °F)   Resp 16   Wt 67.7 kg (149 lb 2.3 oz)   SpO2 96%   BMI 25.60 kg/m²     LABS:    CBC:  Recent Labs     07/16/25  0752 01/31/25  0837 01/13/25  1445 06/17/24  1148 03/07/24  0831   WBC 5.8 6.4 8.7 10.1 7.0   HGB 14.6 13.8 12.5 13.8 13.9   HCT 43.5 42.5 37.9 40.2 41.7    302 297 242 268   MCV 96 93.2 93 95 89       CMP:  Recent Labs     01/31/25  0837 08/05/24  0724 03/07/24  0831 12/04/23  1425 08/31/23  1216 02/23/23  1145 04/23/19  1243 10/15/18  1224 07/26/18  1151 01/25/18  1152    142 141 139 141 140   < > 143   < > 140   K 4.4 4.4 4.3 4.3 4.3 4.2   < > 3.8   < > 4.1    108* 107 104 106 106   < > 109*   < > 107   CO2 26 27 27 25 26 24   < > 25   < > 25   ANIONGAP 6* 11 11 14 13 14   < > 13   < > 12   BUN 21 19 23 18 22 15   < > 18   < > 12   CREATININE 0.84 0.86 0.87 0.77 0.76 0.76   < > 0.80   < > 0.75   EGFR 72 70 69 80  --   --   --   --   --   --    MG  --   --   --   --   --  2.08  --  2.11  --  2.15    < > = values in this interval not displayed.     Recent Labs     01/31/25  0837 08/05/24  0724 03/07/24  0831 12/04/23  1425 08/31/23  1216   ALBUMIN 4.1 3.9 4.2 4.3 4.4   ALKPHOS 70 69 80 78 80   ALT 14 12 12 14 12   AST 16 15 14 15 14   BILITOT 0.5 0.5 0.5 0.3 0.6       HEME/ENDO:  Recent Labs     07/16/25  0752 01/31/25  0837 01/13/25  1445 08/05/24  0724 06/17/24  1148 03/07/24  0831 09/27/23  1424 08/31/23  1216 04/28/23  1522 02/23/23  1145 11/15/22  1530 08/18/22  1139 07/22/22  1527 05/26/22  1222   FERRITIN 82  --  32  --  132 44   < >  --    < > 43   < >  --    < >  --    IRONSAT 74*  --  13*  --  36 42   < >  --    < > 13*   < >  --    < >  --    TSH  --  0.91   "--  0.96  --   --   --  1.18  --  0.99  --  0.74  --  0.63   [id number]  --   --   --   --   --   --   --   --   --   --   --  896  --  >2000*    < > = values in this interval not displayed.        MICRO: No results for input(s): \"ESR\", \"CRP\", \"PROCAL\" in the last 29404 hours.  No results found for the last 90 days.        TUMOR MARKERS:  No results found for: \"LABCA2\", \"CEA\", \"\", \"PSA\", \"AFPTM\", \"HCGTM\", \"\"             IMAGING:         XR DEXA bone density  Narrative: Interpreted By:  Dwayne Herrera,   STUDY:  DEXA BONE DENSITY2/4/2025 1:47 pm      INDICATION:  Screening      COMPARISON:  DEXA scan 01/26/2023.      ACCESSION NUMBER(S):  [id number]      ORDERING CLINICIAN:  STALIN MONAHAN      TECHNIQUE:  DEXA BONE DENSITY      FINDINGS:  LEFT FEMUR -TOTAL  Bone Mineral Density: 0.848  T-Score -1.3   Z-Score  0.6      LEFT FEMUR -NECK  Bone Mineral Density: 0.771  T-Score -1.9  Z-Score 0.1      SPINE L1-L4  Bone Mineral Density: 1.158  T-Score -0.3  Z-Score 1.5      World Health Organization (WHO) criteria for post-menopausal,   Women:  Normal:         T-score at or above -1 SD  Osteopenia:   T-score between -1 and -2.5 SD  Osteoporosis: T-score at or below -2.5 SD          10-year Fracture Risk:  Major Osteoporotic Fracture  14.5%  Hip Fracture                       3.9%      Impression: DEXA:  According to World Health Organization criteria,  classification is low bone mass (osteopenia)      Followup recommended in two years or sooner as clinically warranted.      All images and detailed analysis are available on the  Radiology  PACS.      Signed by: Dwayne Herrera 2/4/2025 2:12 PM  Dictation workstation:   [id number]       Current Outpatient Medications   Medication Sig Dispense Refill    calcium citrate-vitamin D3 (Citracal+D) 315 mg-5 mcg (200 unit) tablet Take 1 tablet by mouth once daily.      cholecalciferol (Vitamin D-3) 50 MCG (2000 UT) tablet Take 1 tablet (50 mcg) by mouth once " daily.      cyanocobalamin (Vitamin B-12) 1,000 mcg tablet Take 2 tablets (2,000 mcg) by mouth once daily.      levothyroxine (Synthroid, Levoxyl) 100 mcg tablet TAKE 1 TABLET BY MOUTH ONCE DAILY. 90 tablet 1    losartan (Cozaar) 25 mg tablet TAKE 1 TABLET BY MOUTH EVERY DAY 90 tablet 1    magnesium oxide (Mag-Ox) 400 mg (241.3 mg magnesium) tablet Take 1 tablet by mouth once daily.      quercetin 500 mg capsule Take 1 capsule by mouth once daily.       No current facility-administered medications for this visit.                  SOCIAL HISTORY:    reports current alcohol use.   reports no history of drug use.,   Tobacco Use: Medium Risk (2/28/2025)    Patient History     Smoking Tobacco Use: Former     Smokeless Tobacco Use: Never     Passive Exposure: Not on file       FAMILY HISTORY:  Family History   Problem Relation Name Age of Onset    Other (completed suicide) Father      Other (homicide) Brother      Alzheimer's disease Mother's Sister      Other (parkinsons) Maternal Grandmother         ALLERGY:   Patient has no known allergies.           she quit smoking about 52 years ago. Her smoking use included cigarettes. She has never used smokeless tobacco.  She  reports current alcohol use.  She  reports no history of drug use.      Medication reviewed in e-chart  Patient is monitored for medication toxicity  labs reviewed and interpreted independently, X rays independently reviewed  Notes from other physicians involved in care were reviewed    Charting was completed using voice recognition technology and may include unintended errors.    CLAUDIA GODOY MD, YENNY.    Annmarie Dumont Master Clinician in Hematology and Oncology  Medical Director, [facility] at [facility].  [place] office  Phone [phone]  Fax      [phone]  [facility] /[place].  Phone [phone]  Fax     [phone]

## 2025-07-18 NOTE — PATIENT INSTRUCTIONS
ORDERS & PATIENT INSTRUCTIONS:     Patient can have 1 unit of blood donation via blood bank        Recheck it in 6-month     CBC, fasting iron group and ferritin in 6 months

## 2025-07-18 NOTE — PROGRESS NOTES
Patient to donate 1 unit blood via blood bank.  Follow-up in 6 months with labs prior at [facility].  Patient teaching provided regarding [facility] transition and patient notified paper lab requisition will be required for non-[facility] labs.   Call back instructions reviewed.  Patient verbalized understanding.